# Patient Record
Sex: FEMALE | Race: WHITE | Employment: OTHER | URBAN - METROPOLITAN AREA
[De-identification: names, ages, dates, MRNs, and addresses within clinical notes are randomized per-mention and may not be internally consistent; named-entity substitution may affect disease eponyms.]

---

## 2023-07-14 ENCOUNTER — HOSPITAL ENCOUNTER (EMERGENCY)
Age: 88
Discharge: HOME | End: 2023-07-14
Attending: STUDENT IN AN ORGANIZED HEALTH CARE EDUCATION/TRAINING PROGRAM
Payer: MEDICARE

## 2023-07-14 ENCOUNTER — APPOINTMENT (OUTPATIENT)
Dept: GENERAL RADIOLOGY | Age: 88
End: 2023-07-14
Payer: MEDICARE

## 2023-07-14 VITALS
OXYGEN SATURATION: 99 % | SYSTOLIC BLOOD PRESSURE: 146 MMHG | HEIGHT: 58 IN | RESPIRATION RATE: 16 BRPM | WEIGHT: 121 LBS | BODY MASS INDEX: 25.4 KG/M2 | HEART RATE: 64 BPM | TEMPERATURE: 98.2 F | DIASTOLIC BLOOD PRESSURE: 54 MMHG

## 2023-07-14 DIAGNOSIS — S39.012A STRAIN OF LUMBAR REGION, INITIAL ENCOUNTER: Primary | ICD-10-CM

## 2023-07-14 DIAGNOSIS — M54.32 BILATERAL SCIATICA: ICD-10-CM

## 2023-07-14 DIAGNOSIS — M54.31 BILATERAL SCIATICA: ICD-10-CM

## 2023-07-14 PROCEDURE — 6360000002 HC RX W HCPCS: Performed by: STUDENT IN AN ORGANIZED HEALTH CARE EDUCATION/TRAINING PROGRAM

## 2023-07-14 PROCEDURE — 72100 X-RAY EXAM L-S SPINE 2/3 VWS: CPT

## 2023-07-14 PROCEDURE — 96372 THER/PROPH/DIAG INJ SC/IM: CPT

## 2023-07-14 PROCEDURE — 99284 EMERGENCY DEPT VISIT MOD MDM: CPT

## 2023-07-14 PROCEDURE — 73030 X-RAY EXAM OF SHOULDER: CPT

## 2023-07-14 RX ORDER — SERTRALINE HYDROCHLORIDE 25 MG/1
25 TABLET, FILM COATED ORAL DAILY
COMMUNITY

## 2023-07-14 RX ORDER — LIDOCAINE 4 G/G
1 PATCH TOPICAL DAILY
Qty: 30 PATCH | Refills: 0 | Status: SHIPPED | OUTPATIENT
Start: 2023-07-14 | End: 2023-08-13

## 2023-07-14 RX ORDER — OMEPRAZOLE 20 MG/1
40 CAPSULE, DELAYED RELEASE ORAL DAILY
COMMUNITY

## 2023-07-14 RX ORDER — ACETAMINOPHEN 160 MG
TABLET,DISINTEGRATING ORAL
COMMUNITY

## 2023-07-14 RX ORDER — PREGABALIN 25 MG/1
25 CAPSULE ORAL 2 TIMES DAILY
COMMUNITY

## 2023-07-14 RX ORDER — LEVOTHYROXINE SODIUM 0.07 MG/1
75 TABLET ORAL DAILY
COMMUNITY

## 2023-07-14 RX ORDER — ASPIRIN 81 MG/1
81 TABLET, CHEWABLE ORAL DAILY
COMMUNITY

## 2023-07-14 RX ORDER — LOSARTAN POTASSIUM 100 MG/1
100 TABLET ORAL DAILY
COMMUNITY

## 2023-07-14 RX ORDER — ATORVASTATIN CALCIUM 40 MG/1
40 TABLET, FILM COATED ORAL DAILY
COMMUNITY

## 2023-07-14 RX ORDER — PREDNISONE 10 MG/1
TABLET ORAL
Qty: 20 TABLET | Refills: 0 | Status: SHIPPED | OUTPATIENT
Start: 2023-07-14 | End: 2023-07-24

## 2023-07-14 RX ORDER — ATENOLOL 50 MG/1
50 TABLET ORAL DAILY
COMMUNITY

## 2023-07-14 RX ORDER — DEXAMETHASONE SODIUM PHOSPHATE 4 MG/ML
8 INJECTION, SOLUTION INTRA-ARTICULAR; INTRALESIONAL; INTRAMUSCULAR; INTRAVENOUS; SOFT TISSUE ONCE
Status: COMPLETED | OUTPATIENT
Start: 2023-07-14 | End: 2023-07-14

## 2023-07-14 RX ORDER — METHOCARBAMOL 750 MG/1
750 TABLET, FILM COATED ORAL 4 TIMES DAILY
Qty: 40 TABLET | Refills: 0 | Status: SHIPPED | OUTPATIENT
Start: 2023-07-14 | End: 2023-07-24

## 2023-07-14 RX ADMIN — DEXAMETHASONE SODIUM PHOSPHATE 8 MG: 4 INJECTION, SOLUTION INTRAMUSCULAR; INTRAVENOUS at 12:33

## 2023-07-14 ASSESSMENT — PAIN SCALES - GENERAL: PAINLEVEL_OUTOF10: 4

## 2023-07-14 ASSESSMENT — PAIN - FUNCTIONAL ASSESSMENT: PAIN_FUNCTIONAL_ASSESSMENT: 0-10

## 2023-07-14 ASSESSMENT — PAIN DESCRIPTION - ORIENTATION: ORIENTATION: LOWER;MID

## 2023-07-14 ASSESSMENT — PAIN DESCRIPTION - LOCATION: LOCATION: BACK

## 2023-07-14 NOTE — ED PROVIDER NOTES
Emergency Department Provider Note  Location: [address] [place] [address]  7/14/2023     Patient Identification  Anjelica Hazel is a 80 y.o. female    Chief Complaint  Back Pain (Pt c/o lower back pain. States she thinks it's flared up from riding in different wheelchairs. Has been living with her daughter x past 9 days and daughter thinks this is an exacerbation of OA. Pt ambulated to room with walker.)      Mode of Arrival  private car    HPI  (History provided by patient)  This is a 80 y.o. female with a PMH significant for HTN, HLD  presented today for acute on chronic low back pain. Symptoms have been ongoing for the last 9 days. She is currently from out of town. Patient reports that she think she flared up from riding in different wheelchairs over the past couple of days. She is also sleeping on a new bed. She is still able to ambulate with a walker. Pain is midline and sometimes radiates down her legs. Denies any new numbness or tingling. Denies any injury. Denies any nausea or vomiting. Denies any fever. She is taking Tylenol and using pain patches with no significant B..      ROS  Review of Systems   All other systems reviewed and are negative. I have reviewed the following nursing documentation:  Allergies: Allergies   Allergen Reactions    Ciprofloxacin        Past medical history:  has a past medical history of Cerebral artery occlusion with cerebral infarction [facility]), Depression, Hemorrhagic stroke ([address] [facility]), Hyperlipidemia, Hypertension, and Thyroid disease. Past surgical history:  has a past surgical history that includes Cardiac surgery. Home medications:   Prior to Admission medications    Medication Sig Start Date End Date Taking? Authorizing Provider   pregabalin (LYRICA) 25 MG capsule Take 1 capsule by mouth 2 times daily.  Max Daily Amount: 50 mg   Yes Historical Provider, MD   atenolol (TENORMIN) 50 MG tablet Take 1 tablet by mouth daily   Yes Historical

## 2024-07-21 ENCOUNTER — HOSPITAL ENCOUNTER (INPATIENT)
Age: 89
LOS: 4 days | Discharge: HOME | End: 2024-07-25
Attending: STUDENT IN AN ORGANIZED HEALTH CARE EDUCATION/TRAINING PROGRAM | Admitting: INTERNAL MEDICINE
Payer: MEDICARE

## 2024-07-21 ENCOUNTER — APPOINTMENT (OUTPATIENT)
Dept: CT IMAGING | Age: 89
End: 2024-07-21
Payer: MEDICARE

## 2024-07-21 ENCOUNTER — APPOINTMENT (OUTPATIENT)
Dept: GENERAL RADIOLOGY | Age: 89
End: 2024-07-21
Payer: MEDICARE

## 2024-07-21 DIAGNOSIS — S06.5XAA SUBDURAL HEMATOMA (HCC): Primary | ICD-10-CM

## 2024-07-21 DIAGNOSIS — E87.1 HYPONATREMIA: ICD-10-CM

## 2024-07-21 PROBLEM — I10 PRIMARY HYPERTENSION: Status: ACTIVE | Noted: 2024-07-21

## 2024-07-21 PROBLEM — E03.9 HYPOTHYROIDISM: Status: ACTIVE | Noted: 2024-07-21

## 2024-07-21 LAB
ALBUMIN SERPL-MCNC: 4 G/DL (ref 3.4–5)
ALBUMIN/GLOB SERPL: 1.2 {RATIO} (ref 1.1–2.2)
ALP SERPL-CCNC: 63 U/L (ref 40–129)
ALT SERPL-CCNC: 20 U/L (ref 10–40)
ANION GAP SERPL CALCULATED.3IONS-SCNC: 11 MMOL/L (ref 3–16)
APTT BLD: 22.9 SEC (ref 22.1–36.4)
AST SERPL-CCNC: 28 U/L (ref 15–37)
BASOPHILS # BLD: 0 K/UL (ref 0–0.2)
BASOPHILS NFR BLD: 0.5 %
BILIRUB SERPL-MCNC: 0.5 MG/DL (ref 0–1)
BILIRUB UR QL STRIP.AUTO: NEGATIVE
BUN SERPL-MCNC: 10 MG/DL (ref 7–20)
CALCIUM SERPL-MCNC: 9.1 MG/DL (ref 8.3–10.6)
CHLORIDE SERPL-SCNC: 88 MMOL/L (ref 99–110)
CLARITY UR: CLEAR
CLOSURE TME BLD-IMP: ABNORMAL
CLOSURE TME COLL+ADP BLD: 144 SEC (ref 56–110)
CLOSURE TME COLL+EPINEP BLD: 283 SEC (ref 86–194)
CO2 SERPL-SCNC: 23 MMOL/L (ref 21–32)
COLOR UR: YELLOW
CREAT SERPL-MCNC: <0.5 MG/DL (ref 0.6–1.2)
DEPRECATED RDW RBC AUTO: 14.6 % (ref 12.4–15.4)
EOSINOPHIL # BLD: 0.1 K/UL (ref 0–0.6)
EOSINOPHIL NFR BLD: 1.4 %
GFR SERPLBLD CREATININE-BSD FMLA CKD-EPI: 87 ML/MIN/{1.73_M2}
GLUCOSE SERPL-MCNC: 105 MG/DL (ref 70–99)
GLUCOSE UR STRIP.AUTO-MCNC: NEGATIVE MG/DL
HCT VFR BLD AUTO: 34.9 % (ref 36–48)
HGB BLD-MCNC: 11.7 G/DL (ref 12–16)
HGB UR QL STRIP.AUTO: NEGATIVE
INR PPP: 1.08 (ref 0.85–1.15)
KETONES UR STRIP.AUTO-MCNC: NEGATIVE MG/DL
LEUKOCYTE ESTERASE UR QL STRIP.AUTO: NEGATIVE
LYMPHOCYTES # BLD: 1.2 K/UL (ref 1–5.1)
LYMPHOCYTES NFR BLD: 18.5 %
MCH RBC QN AUTO: 28.7 PG (ref 26–34)
MCHC RBC AUTO-ENTMCNC: 33.7 G/DL (ref 31–36)
MCV RBC AUTO: 85.3 FL (ref 80–100)
MONOCYTES # BLD: 0.7 K/UL (ref 0–1.3)
MONOCYTES NFR BLD: 10.7 %
NEUTROPHILS # BLD: 4.3 K/UL (ref 1.7–7.7)
NEUTROPHILS NFR BLD: 68.9 %
NITRITE UR QL STRIP.AUTO: NEGATIVE
OSMOLALITY SERPL: 268 MOSM/KG (ref 278–305)
OSMOLALITY UR: 230 MOSM/KG (ref 390–1070)
PH UR STRIP.AUTO: 7 [PH] (ref 5–8)
PLATELET # BLD AUTO: 240 K/UL (ref 135–450)
PMV BLD AUTO: 6.9 FL (ref 5–10.5)
POTASSIUM SERPL-SCNC: 4.8 MMOL/L (ref 3.5–5.1)
POTASSIUM UR-SCNC: 21.7 MMOL/L
PROT SERPL-MCNC: 7.3 G/DL (ref 6.4–8.2)
PROT UR STRIP.AUTO-MCNC: NEGATIVE MG/DL
PROTHROMBIN TIME: 14.3 SEC (ref 11.9–14.9)
RBC # BLD AUTO: 4.09 M/UL (ref 4–5.2)
SODIUM SERPL-SCNC: 122 MMOL/L (ref 136–145)
SODIUM SERPL-SCNC: 124 MMOL/L (ref 136–145)
SODIUM SERPL-SCNC: 128 MMOL/L (ref 136–145)
SODIUM UR-SCNC: 44 MMOL/L
SP GR UR STRIP.AUTO: <=1.005 (ref 1–1.03)
TROPONIN, HIGH SENSITIVITY: 17 NG/L (ref 0–14)
TSH SERPL DL<=0.005 MIU/L-ACNC: 14.84 UIU/ML (ref 0.27–4.2)
UA DIPSTICK W REFLEX MICRO PNL UR: NORMAL
URN SPEC COLLECT METH UR: NORMAL
UROBILINOGEN UR STRIP-ACNC: 0.2 E.U./DL
WBC # BLD AUTO: 6.2 K/UL (ref 4–11)

## 2024-07-21 PROCEDURE — 36415 COLL VENOUS BLD VENIPUNCTURE: CPT

## 2024-07-21 PROCEDURE — 87086 URINE CULTURE/COLONY COUNT: CPT

## 2024-07-21 PROCEDURE — 83935 ASSAY OF URINE OSMOLALITY: CPT

## 2024-07-21 PROCEDURE — 70450 CT HEAD/BRAIN W/O DYE: CPT

## 2024-07-21 PROCEDURE — 85610 PROTHROMBIN TIME: CPT

## 2024-07-21 PROCEDURE — 84439 ASSAY OF FREE THYROXINE: CPT

## 2024-07-21 PROCEDURE — 72125 CT NECK SPINE W/O DYE: CPT

## 2024-07-21 PROCEDURE — 84300 ASSAY OF URINE SODIUM: CPT

## 2024-07-21 PROCEDURE — 72170 X-RAY EXAM OF PELVIS: CPT

## 2024-07-21 PROCEDURE — 84133 ASSAY OF URINE POTASSIUM: CPT

## 2024-07-21 PROCEDURE — 6370000000 HC RX 637 (ALT 250 FOR IP): Performed by: STUDENT IN AN ORGANIZED HEALTH CARE EDUCATION/TRAINING PROGRAM

## 2024-07-21 PROCEDURE — 83930 ASSAY OF BLOOD OSMOLALITY: CPT

## 2024-07-21 PROCEDURE — 2060000000 HC ICU INTERMEDIATE R&B

## 2024-07-21 PROCEDURE — 80053 COMPREHEN METABOLIC PANEL: CPT

## 2024-07-21 PROCEDURE — 85576 BLOOD PLATELET AGGREGATION: CPT

## 2024-07-21 PROCEDURE — 84443 ASSAY THYROID STIM HORMONE: CPT

## 2024-07-21 PROCEDURE — 84484 ASSAY OF TROPONIN QUANT: CPT

## 2024-07-21 PROCEDURE — 93005 ELECTROCARDIOGRAM TRACING: CPT | Performed by: STUDENT IN AN ORGANIZED HEALTH CARE EDUCATION/TRAINING PROGRAM

## 2024-07-21 PROCEDURE — 84295 ASSAY OF SERUM SODIUM: CPT

## 2024-07-21 PROCEDURE — 85025 COMPLETE CBC W/AUTO DIFF WBC: CPT

## 2024-07-21 PROCEDURE — 99285 EMERGENCY DEPT VISIT HI MDM: CPT

## 2024-07-21 PROCEDURE — 6370000000 HC RX 637 (ALT 250 FOR IP): Performed by: INTERNAL MEDICINE

## 2024-07-21 PROCEDURE — 99223 1ST HOSP IP/OBS HIGH 75: CPT | Performed by: INTERNAL MEDICINE

## 2024-07-21 PROCEDURE — 85730 THROMBOPLASTIN TIME PARTIAL: CPT

## 2024-07-21 PROCEDURE — 81003 URINALYSIS AUTO W/O SCOPE: CPT

## 2024-07-21 PROCEDURE — 2580000003 HC RX 258: Performed by: INTERNAL MEDICINE

## 2024-07-21 RX ORDER — PREGABALIN 25 MG/1
25 CAPSULE ORAL 2 TIMES DAILY
Status: DISCONTINUED | OUTPATIENT
Start: 2024-07-21 | End: 2024-07-25 | Stop reason: HOSPADM

## 2024-07-21 RX ORDER — MAGNESIUM SULFATE IN WATER 40 MG/ML
2000 INJECTION, SOLUTION INTRAVENOUS PRN
Status: DISCONTINUED | OUTPATIENT
Start: 2024-07-21 | End: 2024-07-25 | Stop reason: HOSPADM

## 2024-07-21 RX ORDER — LUBIPROSTONE 24 UG/1
24 CAPSULE ORAL 2 TIMES DAILY WITH MEALS
COMMUNITY

## 2024-07-21 RX ORDER — ONDANSETRON 4 MG/1
4 TABLET, ORALLY DISINTEGRATING ORAL EVERY 8 HOURS PRN
Status: DISCONTINUED | OUTPATIENT
Start: 2024-07-21 | End: 2024-07-25 | Stop reason: HOSPADM

## 2024-07-21 RX ORDER — ACETAMINOPHEN 325 MG/1
650 TABLET ORAL EVERY 6 HOURS PRN
Status: DISCONTINUED | OUTPATIENT
Start: 2024-07-21 | End: 2024-07-25 | Stop reason: HOSPADM

## 2024-07-21 RX ORDER — SODIUM CHLORIDE 9 MG/ML
INJECTION, SOLUTION INTRAVENOUS CONTINUOUS
Status: ACTIVE | OUTPATIENT
Start: 2024-07-21 | End: 2024-07-21

## 2024-07-21 RX ORDER — LEVOTHYROXINE SODIUM 0.07 MG/1
75 TABLET ORAL DAILY
Status: DISCONTINUED | OUTPATIENT
Start: 2024-07-21 | End: 2024-07-25 | Stop reason: HOSPADM

## 2024-07-21 RX ORDER — SODIUM CHLORIDE 9 MG/ML
INJECTION, SOLUTION INTRAVENOUS PRN
Status: DISCONTINUED | OUTPATIENT
Start: 2024-07-21 | End: 2024-07-25 | Stop reason: HOSPADM

## 2024-07-21 RX ORDER — ATORVASTATIN CALCIUM 40 MG/1
40 TABLET, FILM COATED ORAL DAILY
Status: DISCONTINUED | OUTPATIENT
Start: 2024-07-21 | End: 2024-07-25 | Stop reason: HOSPADM

## 2024-07-21 RX ORDER — ACETAMINOPHEN 500 MG
1000 TABLET ORAL
Status: COMPLETED | OUTPATIENT
Start: 2024-07-21 | End: 2024-07-21

## 2024-07-21 RX ORDER — ACETAMINOPHEN 650 MG/1
650 SUPPOSITORY RECTAL EVERY 6 HOURS PRN
Status: DISCONTINUED | OUTPATIENT
Start: 2024-07-21 | End: 2024-07-25 | Stop reason: HOSPADM

## 2024-07-21 RX ORDER — SODIUM CHLORIDE 0.9 % (FLUSH) 0.9 %
5-40 SYRINGE (ML) INJECTION EVERY 12 HOURS SCHEDULED
Status: DISCONTINUED | OUTPATIENT
Start: 2024-07-21 | End: 2024-07-25 | Stop reason: HOSPADM

## 2024-07-21 RX ORDER — POTASSIUM CHLORIDE 20 MEQ/1
40 TABLET, EXTENDED RELEASE ORAL PRN
Status: DISCONTINUED | OUTPATIENT
Start: 2024-07-21 | End: 2024-07-25 | Stop reason: HOSPADM

## 2024-07-21 RX ORDER — ONDANSETRON 2 MG/ML
4 INJECTION INTRAMUSCULAR; INTRAVENOUS EVERY 6 HOURS PRN
Status: DISCONTINUED | OUTPATIENT
Start: 2024-07-21 | End: 2024-07-25 | Stop reason: HOSPADM

## 2024-07-21 RX ORDER — POTASSIUM CHLORIDE 7.45 MG/ML
10 INJECTION INTRAVENOUS PRN
Status: DISCONTINUED | OUTPATIENT
Start: 2024-07-21 | End: 2024-07-25 | Stop reason: HOSPADM

## 2024-07-21 RX ORDER — POLYETHYLENE GLYCOL 3350 17 G/17G
17 POWDER, FOR SOLUTION ORAL DAILY PRN
Status: DISCONTINUED | OUTPATIENT
Start: 2024-07-21 | End: 2024-07-25 | Stop reason: HOSPADM

## 2024-07-21 RX ORDER — LOSARTAN POTASSIUM 25 MG/1
100 TABLET ORAL DAILY
Status: DISCONTINUED | OUTPATIENT
Start: 2024-07-21 | End: 2024-07-25 | Stop reason: HOSPADM

## 2024-07-21 RX ORDER — HYDRALAZINE HYDROCHLORIDE 20 MG/ML
10 INJECTION INTRAMUSCULAR; INTRAVENOUS EVERY 6 HOURS PRN
Status: DISCONTINUED | OUTPATIENT
Start: 2024-07-21 | End: 2024-07-25 | Stop reason: HOSPADM

## 2024-07-21 RX ORDER — ATENOLOL 50 MG/1
50 TABLET ORAL 2 TIMES DAILY
Status: DISCONTINUED | OUTPATIENT
Start: 2024-07-21 | End: 2024-07-25 | Stop reason: HOSPADM

## 2024-07-21 RX ORDER — SODIUM CHLORIDE 0.9 % (FLUSH) 0.9 %
5-40 SYRINGE (ML) INJECTION PRN
Status: DISCONTINUED | OUTPATIENT
Start: 2024-07-21 | End: 2024-07-25 | Stop reason: HOSPADM

## 2024-07-21 RX ADMIN — LOSARTAN POTASSIUM 100 MG: 25 TABLET, FILM COATED ORAL at 11:03

## 2024-07-21 RX ADMIN — ATENOLOL 50 MG: 50 TABLET ORAL at 20:10

## 2024-07-21 RX ADMIN — SODIUM CHLORIDE, PRESERVATIVE FREE 10 ML: 5 INJECTION INTRAVENOUS at 11:04

## 2024-07-21 RX ADMIN — ACETAMINOPHEN 1000 MG: 500 TABLET ORAL at 08:00

## 2024-07-21 RX ADMIN — PREGABALIN 25 MG: 25 CAPSULE ORAL at 20:10

## 2024-07-21 RX ADMIN — PREGABALIN 25 MG: 25 CAPSULE ORAL at 11:03

## 2024-07-21 RX ADMIN — ATORVASTATIN CALCIUM 40 MG: 40 TABLET, FILM COATED ORAL at 11:03

## 2024-07-21 ASSESSMENT — PAIN - FUNCTIONAL ASSESSMENT
PAIN_FUNCTIONAL_ASSESSMENT: PREVENTS OR INTERFERES SOME ACTIVE ACTIVITIES AND ADLS
PAIN_FUNCTIONAL_ASSESSMENT: 0-10
PAIN_FUNCTIONAL_ASSESSMENT: PREVENTS OR INTERFERES SOME ACTIVE ACTIVITIES AND ADLS
PAIN_FUNCTIONAL_ASSESSMENT: 0-10
PAIN_FUNCTIONAL_ASSESSMENT: ACTIVITIES ARE NOT PREVENTED

## 2024-07-21 ASSESSMENT — PAIN DESCRIPTION - PAIN TYPE: TYPE: ACUTE PAIN

## 2024-07-21 ASSESSMENT — PAIN DESCRIPTION - ORIENTATION
ORIENTATION: POSTERIOR

## 2024-07-21 ASSESSMENT — PAIN DESCRIPTION - ONSET: ONSET: SUDDEN

## 2024-07-21 ASSESSMENT — PAIN SCALES - GENERAL
PAINLEVEL_OUTOF10: 8

## 2024-07-21 ASSESSMENT — PAIN DESCRIPTION - LOCATION
LOCATION: HEAD

## 2024-07-21 ASSESSMENT — PAIN DESCRIPTION - FREQUENCY: FREQUENCY: CONTINUOUS

## 2024-07-21 ASSESSMENT — PAIN DESCRIPTION - DESCRIPTORS: DESCRIPTORS: OTHER (COMMENT)

## 2024-07-21 NOTE — ED NOTES
ED TO INPATIENT SBAR HANDOFF    Patient Name: Zully Escudero   :  1931  89 y.o.   MRN:  [id number]  Preferred Name    ED Room #:  A07/A07-07  Family/Caregiver Present yes   Restraints no   Sitter no   Sepsis Risk Score      Situation  Code Status: No Order No additional code details.    Allergies: Ciprofloxacin  Weight: Patient Vitals for the past 96 hrs (Last 3 readings):   Weight   24 0726 55.1 kg (121 lb 8 oz)     Arrived from: home- pt lives in [place], in her own home with daytime care  Pt is currently visiting/staying with her daughter  Chief Complaint:   Chief Complaint   Patient presents with    Fall    Head Injury     Hospital Problem/Diagnosis:  Principal Problem:    Subdural hematoma (HCC)  Resolved Problems:    * No resolved hospital problems. *    Imaging:   XR PELVIS (1-2 VIEWS)   Final Result   1. Degenerative hypertrophic osteoarthritis of the hips, left greater than right   2. SI joint arthropathy   3. Lower lumbar spondylosis and scoliosis   4. No significant interval changes      Electronically signed by Will Colin MD      CT C-Spine W/O Contrast   Final Result      Degenerative changes, spondylosis.   2. No evidence of acute fracture or dislocation   Levoscoliosis      Electronically signed by Will Colin MD      CT Head W/O Contrast   Final Result   1. Right tentorial subdural hematoma measuring 5.3 mm in thickness. There is a central area of lucency on image 43. Whether this represents a small \"swirl sign\" is uncertain. Short-term interval follow-up imaging is recommended for surveillance.      2. Periventricular microangiopathic ischemic changes, chronic small vessel disease   3. Age-related atrophic changes         Critical results reported to Physician: Jody Tobar at 8:12 AM on 2024.       Electronically signed by Will Colin MD        Abnormal labs:   Abnormal Labs Reviewed   CBC WITH AUTO DIFFERENTIAL - Abnormal; Notable for the

## 2024-07-21 NOTE — PROGRESS NOTES
Urine has been sent, urine did make it in the hat this time while the PCA assisted the pt to the bathroom then back to bed. Emily Perera RN

## 2024-07-21 NOTE — PLAN OF CARE
Problem: Discharge Planning  Goal: Discharge to home or other facility with appropriate resources  Outcome: Progressing     Problem: Pain  Goal: Verbalizes/displays adequate comfort level or baseline comfort level  Outcome: Progressing     Problem: Safety - Adult  Goal: Free from fall injury  Outcome: Progressing     Problem: Discharge Planning  Goal: Discharge to home or other facility with appropriate resources  Outcome: Progressing     Problem: Pain  Goal: Verbalizes/displays adequate comfort level or baseline comfort level  Outcome: Progressing     Problem: Safety - Adult  Goal: Free from fall injury  Outcome: Progressing

## 2024-07-21 NOTE — ED PROVIDER NOTES
THE [facility]  EMERGENCY DEPARTMENT ENCOUNTER          ATTENDING PHYSICIAN NOTE       Date of evaluation: 7/21/2024    Chief Complaint     Fall and Head Injury      History of Present Illness     Zully Escudero is a 89 y.o. female who presents to the emergency department after a fall from standing.  Patient reports that she was trying to get out of bed this morning when she had a fall.  History obtained from the patient, EMS, and family members.  Patient reported that she was getting up to go to the bathroom and had a mechanical fall falling and striking the back of her head on her bed.  No loss of consciousness, seizure activity, nausea or vomiting afterwards.  Patient reported she felt a little bit woozy which prompted family members to call 911.  Patient reports that she is on 81 mg of aspirin due to a history of a stroke.  Reports that she has pain in her buttock as well.  Was on the ground for no more than a few moments his family members heard the fall and came to her aid immediately.  Denies chest pain, abdominal pain, pain in her extremities or difficulty with range of motion of the extremities    ASSESSMENT / PLAN  (MEDICAL DECISION MAKING)     INITIAL VITALS: BP: (!) 180/62, Temp: 97.6 °F (36.4 °C), Pulse: 55, Respirations: 16, SpO2: 97 %      Zully Escudero is a 89 y.o. female who presents emergency department for a mechanical fall resulting in a subdural hematoma.  Patient is 89, on 81 mg of aspirin daily, has a history of stroke, hypothyroidism, and presented to the emergency department after having a mechanical fall while trying to get of bed to go to the bathroom.  Patient on my examination is noted to have intact primary survey and on secondary survey has a very small hematoma on her posterior occipital scalp without overlying skin lacerations or abrasions.  Patient is GCS 15 interactive with exam and has no neurologic deficits.  Patient denies any preceding symptoms to  changes   Psychiatric/Behavioral:  Negative for dysphoric mood and suicidal ideas.    All other systems reviewed and are negative.      Past Medical, Surgical, Family, and Social History     She has a past medical history of Cerebral artery occlusion with cerebral infarction (HCC), Depression, Hemorrhagic stroke (HCC), Hyperlipidemia, Hypertension, and Thyroid disease.  She has a past surgical history that includes Cardiac surgery.  Her family history is not on file.  She reports that she has never smoked. She has never used smokeless tobacco. She reports that she does not drink alcohol.    Medications     Previous Medications    ASPIRIN 81 MG CHEWABLE TABLET    Take 1 tablet by mouth daily    ATENOLOL (TENORMIN) 50 MG TABLET    Take 1 tablet by mouth in the morning and at bedtime    ATORVASTATIN (LIPITOR) 40 MG TABLET    Take 1 tablet by mouth daily    CHOLECALCIFEROL (VITAMIN D3) 50 MCG (2000 UT) CAPS    Take by mouth    LEVOTHYROXINE (SYNTHROID) 75 MCG TABLET    Take 1 tablet by mouth Daily    LOSARTAN (COZAAR) 100 MG TABLET    Take 1 tablet by mouth daily    LUBIPROSTONE (AMITIZA) 24 MCG CAPSULE    Take 1 capsule by mouth 2 times daily (with meals)    PREGABALIN (LYRICA) 25 MG CAPSULE    Take 1 capsule by mouth 2 times daily.    SERTRALINE (ZOLOFT) 25 MG TABLET    Take 1 tablet by mouth daily       Allergies     She is allergic to ciprofloxacin.    Physical Exam     INITIAL VITALS: BP: (!) 180/62, Temp: 97.6 °F (36.4 °C), Pulse: 55, Respirations: 16, SpO2: 97 %   Physical Exam  General:  Well appearing. No acute distress.  Non-toxic appearing elderly female who is awake and alert sitting on the stretcher    HEENT: Head normocephalic with a area of swelling over the occiput.  There are no overlying lacerations or ecchymosis., no Vieira's sign or Rac[place] eyes, pupils equal round and reactive to light, EOMI, sclera clear, no facial tenderness to palpation or step offs, no midface instability, no hemotympanum

## 2024-07-21 NOTE — ED TRIAGE NOTES
Home Medication List is complete.  Source of medications in list is prepackaged pills/patient/family.    Patient states that no medications were taken today.  All medications were taken yesterday      P

## 2024-07-21 NOTE — CONSULTS
Consult received  Na 122  SDH   Hypothyroidism     Urine  lytes ordered  Water restriction   Pt seen    full note later    Rossana Martinez MD

## 2024-07-21 NOTE — PROGRESS NOTES
The pt's heart rate is running in the 50's, perfect served Dr. Guo to see if she wants me to hold the pt's Atenolol. I will continue to follow. Emily Perera RN

## 2024-07-21 NOTE — PROGRESS NOTES
4 Eyes Skin Assessment     NAME:  Zully Escudero  YOB: 1931  MEDICAL RECORD NUMBER:  [id number]    The patient is being assessed for  Admission    I agree that at least one RN has performed a thorough Head to Toe Skin Assessment on the patient. ALL assessment sites listed below have been assessed.      Areas assessed by both nurses:    Head, Face, Ears, Shoulders, Back, Chest, Arms, Elbows, Hands, Sacrum. Buttock, Coccyx, Ischium, and Legs. Feet and Heels        Does the Patient have a Wound? No noted wound(s)    - scattered abrasions  - scattered ecchymosis       Garcia Prevention initiated by RN: Yes  Wound Care Orders initiated by RN: No    Pressure Injury (Stage 3,4, Unstageable, DTI, NWPT, and Complex wounds) if present, place Wound referral order by RN under : No    New Ostomies, if present place, Ostomy referral order under : No     Nurse 1 eSignature: Electronically signed by Chavo Eduardo RN on 7/21/24 at 1:11 PM EDT    **SHARE this note so that the co-signing nurse can place an eSignature**    Nurse 2 eSignature: Electronically signed by Emily Perera RN on 7/21/24 at 1:16 PM EDT

## 2024-07-21 NOTE — PROGRESS NOTES
Pt urinated but missed the hat, I will need to place another hat in the back to catch the urine next time. Emily Perera RN

## 2024-07-21 NOTE — H&P
V2.0  History and Physical      Name:  Zully Escudero /Age/Sex: 1931  (89 y.o. female)   MRN & CSN:  [id number] & [id number] Encounter Date/Time: 2024 1:47 PM EDT   Location:  [facility] PCP: No primary care provider on file.       Hospital Day: 1    Assessment and Plan:   Zully Escudero is a 89 y.o. female with a pmh of irritable bowel syndrome, coronary artery disease, hypertension who presents with Subdural hematoma (HCC)    Hospital Problems             Last Modified POA    * (Principal) Subdural hematoma (HCC) 2024 Yes       Plan:    Subdural hematoma  -CT head shows right tentorial subdural hematoma 5.3 mm  -Neurosurgery consulted  -Full anticoagulation  -Further recommendation per neurosurgery    Mechanical fall  -PT OT consulted    Hyponatremia  -Osmolality urine studies ordered  -TSH ordered  -Nephrology consulted  -Baseline sodium 131    Hypothyroidism  -Continue home meds    Diarrhea history of IBS  -Patient has chronic diarrhea with some worsening 1 week ago.  -If diarrhea persists will get C. difficile test  -Patient is already established with GI  -History of antibiotic course UTI    Coronary artery disease  -Hold aspirin  -Statin    Hypertension  -Monitor blood pressure continue home    Goals of care discussed with daughter.  Patient is a full code        Disposition:   Current Living situation: home  Expected Disposition: home  Estimated D/C: 2-3 days    Diet ADULT DIET; Regular   DVT Prophylaxis [] Lovenox, []  Heparin, [x] SCDs, [] Ambulation,  [] Eliquis, [] Xarelto, [] Coumadin   Code Status Full Code   Surrogate Decision Maker/ POA Daughter      Personally reviewed Lab Studies and Imaging     Discussed management of the case with ED who recommended admission     EKG interpreted personally and results sinus bradycardia    Imaging that was interpreted personally includes CT head  and results SDH            History from:     patient, family member

## 2024-07-22 ENCOUNTER — APPOINTMENT (OUTPATIENT)
Dept: CT IMAGING | Age: 89
End: 2024-07-22
Payer: MEDICARE

## 2024-07-22 LAB
ANION GAP SERPL CALCULATED.3IONS-SCNC: 13 MMOL/L (ref 3–16)
BACTERIA UR CULT: NORMAL
BUN SERPL-MCNC: 10 MG/DL (ref 7–20)
CALCIUM SERPL-MCNC: 8.6 MG/DL (ref 8.3–10.6)
CHLORIDE SERPL-SCNC: 88 MMOL/L (ref 99–110)
CO2 SERPL-SCNC: 22 MMOL/L (ref 21–32)
CREAT SERPL-MCNC: <0.5 MG/DL (ref 0.6–1.2)
EKG ATRIAL RATE: 55 BPM
EKG ATRIAL RATE: 57 BPM
EKG DIAGNOSIS: NORMAL
EKG DIAGNOSIS: NORMAL
EKG P AXIS: 46 DEGREES
EKG P AXIS: 61 DEGREES
EKG P-R INTERVAL: 164 MS
EKG P-R INTERVAL: 184 MS
EKG Q-T INTERVAL: 414 MS
EKG Q-T INTERVAL: 420 MS
EKG QRS DURATION: 68 MS
EKG QRS DURATION: 74 MS
EKG QTC CALCULATION (BAZETT): 396 MS
EKG QTC CALCULATION (BAZETT): 408 MS
EKG R AXIS: -4 DEGREES
EKG R AXIS: 1 DEGREES
EKG T AXIS: 22 DEGREES
EKG T AXIS: 43 DEGREES
EKG VENTRICULAR RATE: 55 BPM
EKG VENTRICULAR RATE: 57 BPM
GFR SERPLBLD CREATININE-BSD FMLA CKD-EPI: 87 ML/MIN/{1.73_M2}
GLUCOSE SERPL-MCNC: 114 MG/DL (ref 70–99)
OSMOLALITY UR: 297 MOSM/KG (ref 390–1070)
POTASSIUM SERPL-SCNC: 4.2 MMOL/L (ref 3.5–5.1)
POTASSIUM UR-SCNC: 34.3 MMOL/L
SODIUM SERPL-SCNC: 123 MMOL/L (ref 136–145)
SODIUM SERPL-SCNC: 124 MMOL/L (ref 136–145)
SODIUM SERPL-SCNC: 125 MMOL/L (ref 136–145)
SODIUM SERPL-SCNC: 127 MMOL/L (ref 136–145)
SODIUM UR-SCNC: 44 MMOL/L
T4 FREE SERPL-MCNC: 1.1 NG/DL (ref 0.9–1.8)

## 2024-07-22 PROCEDURE — 80048 BASIC METABOLIC PNL TOTAL CA: CPT

## 2024-07-22 PROCEDURE — 99222 1ST HOSP IP/OBS MODERATE 55: CPT | Performed by: NURSE PRACTITIONER

## 2024-07-22 PROCEDURE — 97535 SELF CARE MNGMENT TRAINING: CPT

## 2024-07-22 PROCEDURE — 84300 ASSAY OF URINE SODIUM: CPT

## 2024-07-22 PROCEDURE — 70450 CT HEAD/BRAIN W/O DYE: CPT

## 2024-07-22 PROCEDURE — 97166 OT EVAL MOD COMPLEX 45 MIN: CPT

## 2024-07-22 PROCEDURE — 97530 THERAPEUTIC ACTIVITIES: CPT

## 2024-07-22 PROCEDURE — 83935 ASSAY OF URINE OSMOLALITY: CPT

## 2024-07-22 PROCEDURE — 36415 COLL VENOUS BLD VENIPUNCTURE: CPT

## 2024-07-22 PROCEDURE — 99233 SBSQ HOSP IP/OBS HIGH 50: CPT | Performed by: INTERNAL MEDICINE

## 2024-07-22 PROCEDURE — 97162 PT EVAL MOD COMPLEX 30 MIN: CPT

## 2024-07-22 PROCEDURE — 97116 GAIT TRAINING THERAPY: CPT

## 2024-07-22 PROCEDURE — 93010 ELECTROCARDIOGRAM REPORT: CPT | Performed by: INTERNAL MEDICINE

## 2024-07-22 PROCEDURE — 6370000000 HC RX 637 (ALT 250 FOR IP): Performed by: INTERNAL MEDICINE

## 2024-07-22 PROCEDURE — 2580000003 HC RX 258: Performed by: INTERNAL MEDICINE

## 2024-07-22 PROCEDURE — 84133 ASSAY OF URINE POTASSIUM: CPT

## 2024-07-22 PROCEDURE — 2060000000 HC ICU INTERMEDIATE R&B

## 2024-07-22 PROCEDURE — 84295 ASSAY OF SERUM SODIUM: CPT

## 2024-07-22 RX ADMIN — Medication 15 G: at 11:44

## 2024-07-22 RX ADMIN — SODIUM CHLORIDE, PRESERVATIVE FREE 10 ML: 5 INJECTION INTRAVENOUS at 08:49

## 2024-07-22 RX ADMIN — SODIUM CHLORIDE, PRESERVATIVE FREE 10 ML: 5 INJECTION INTRAVENOUS at 21:35

## 2024-07-22 RX ADMIN — LEVOTHYROXINE SODIUM 75 MCG: 0.07 TABLET ORAL at 08:45

## 2024-07-22 RX ADMIN — PREGABALIN 25 MG: 25 CAPSULE ORAL at 08:49

## 2024-07-22 RX ADMIN — PREGABALIN 25 MG: 25 CAPSULE ORAL at 21:35

## 2024-07-22 RX ADMIN — LOSARTAN POTASSIUM 100 MG: 25 TABLET, FILM COATED ORAL at 08:49

## 2024-07-22 RX ADMIN — ATORVASTATIN CALCIUM 40 MG: 40 TABLET, FILM COATED ORAL at 08:49

## 2024-07-22 RX ADMIN — ACETAMINOPHEN 650 MG: 325 TABLET ORAL at 04:29

## 2024-07-22 NOTE — PROGRESS NOTES
Occupational Therapy  Facility/Department: [facility] ORTHO/NEURO  Occupational Therapy Initial Assessment    Name: Zully Escudero  : 1931  MRN: [id number]  Date of Service: 2024    Discharge Recommendations:  Home with assist PRN          Patient Diagnosis(es): The encounter diagnosis was Subdural hematoma (HCC).  Past Medical History:  has a past medical history of Cerebral artery occlusion with cerebral infarction (HCC), Depression, Hemorrhagic stroke (HCC), Hyperlipidemia, Hypertension, and Thyroid disease.  Past Surgical History:  has a past surgical history that includes Cardiac surgery and brain surgery.           Assessment   Assessment: Pt is a 89 y.o. presenting near baseline. Pt admitted following a fall from EOB at [facility] resulting in subdural hematoma. Pt from [place], visiting [facility] in [facility] and plans to DC to [facility] for the next month. Pt does not demo decreased strength or balance this date. Pt currently requiring SBA-SPV for all functional mobility and ADLs with RW. Pt with no acute OT needs at this time. Pt to return home with [facility] with previous services. Reorder if new concerns arise.  Prognosis: Good  Decision Making: Medium Complexity  No Skilled OT: Safe to return home;No OT goals identified  REQUIRES OT FOLLOW-UP: No  Activity Tolerance  Activity Tolerance: Patient Tolerated treatment well                Restrictions  Position Activity Restriction  Other position/activity restrictions: Up with assist    Subjective   General  Chart Reviewed: Yes  Additional Pertinent Hx: 89 y.o. female with a pmh of irritable bowel syndrome, coronary artery disease, hypertension who presents with Subdural hematoma.  Family / Caregiver Present: Yes (daughter)  Referring Practitioner: mushtaq  Diagnosis: subdural hematoma  Subjective  Subjective: Pt in chair on arrival with daughter present. Pt agreeable to PT/OT evaluation.  Pt denies pain.  Social/Functional  History  Social/Functional History  Lives With: Daughter  Type of Home: House  Home Layout: Performs ADL's on one level  Home Access: Stairs to enter without rails  Entrance Stairs - Number of Steps: 1 + 1 + 1+ 1, landing between  Bathroom Shower/Tub: Tub/Shower unit  Bathroom Toilet: Standard  Bathroom Equipment: Shower chair, Hand-held shower, Grab bars in shower (non skid mat)  Bathroom Accessibility: Walker accessible  Home Equipment: Cane, Rollator  Has the patient had two or more falls in the past year or any fall with injury in the past year?: Yes (admitting fall, \"slide forward\" out of bed)  Receives Help From: Family, Personal care attendant  ADL Assistance: Needs assistance (SPV for bathing, assist for tub transfer, assist for dressing)  Toileting: Independent  Homemaking Assistance:  (pt occasionally cooks, light meal prep/cleaning, staff helps with light cleaning)  Ambulation Assistance: Independent (with rollator)  Transfer Assistance: Independent  Active : No  Patient's  Info: caregivers drive  Additional Comments: Pt planning to DC home to [facility] for next ~1 month. Information taken reflecting [facility]. When at home Home care ~9AM-3PM, 7PM-AM ~3x week.       Objective   Temp: 97.6 °F (36.4 °C)  Pulse: 52  Heart Rate Source: Monitor  Respirations: 16  SpO2: 96 %  O2 Device: None (Room air)  BP: (!) 167/60  MAP (Calculated): 96  BP Location: Left upper arm  BP Method: Automatic  Patient Position: Up in chair             Safety Devices  Type of Devices: Left in chair;Chair alarm in place;Call light within reach;Nurse notified  Balance  Sitting: Intact (toilet and chair with SPV)  Standing: With support (SBA-SPV with RW)  Gait  Gait Training: Yes (Pt ambulated within room and hallway with SBA and RW. No LOB noted. Slow steady pace.)  Overall Level of Assistance: Stand-by assistance  Toilet Transfers  Toilet - Technique: Ambulating  Equipment Used: Grab bars  Toilet Transfer:

## 2024-07-22 NOTE — CARE COORDINATION
Case Management Assessment  Initial Evaluation    Date/Time of Evaluation: 7/22/2024 1:16 PM  Assessment Completed by: Aida Devine RN    If patient is discharged prior to next notation, then this note serves as note for discharge by case management.    Patient Name: Zully Escudero                   YOB: 1931  Diagnosis: Subdural hematoma (HCC) [S06.5XAA]                   Date / Time: 7/21/2024  7:16 AM    Patient Admission Status: Inpatient   Readmission Risk (Low < 19, Mod (19-27), High > 27): Readmission Risk Score: 11.9    Current PCP: No primary care provider on file.  PCP verified by CM? No    Chart Reviewed: Yes      History Provided by: Patient  Patient Orientation: Alert and Oriented    Patient Cognition: Alert    Hospitalization in the last 30 days (Readmission):  No    If yes, Readmission Assessment in CM Navigator will be completed.    Advance Directives:      Code Status: Full Code   Patient's Primary Decision Maker is: Legal Next of Kin      Discharge Planning:    Patient lives with: Children Type of Home: House  Primary Care Giver: Self  Patient Support Systems include: Children   Current Financial resources: Medicare  Current community resources: Other (Comment)  Current services prior to admission:              Current DME:              Type of Home Care services:       ADLS  Prior functional level: Independent in ADLs/IADLs  Current functional level: Independent in ADLs/IADLs    PT AM-PAC: 22 /24  OT AM-PAC: 22 /24    Family can provide assistance at DC: Yes  Would you like Case Management to discuss the discharge plan with any other family members/significant others, and if so, who? Yes (daughter Kanika)  Plans to Return to Present Housing: Yes  Other Identified Issues/Barriers to RETURNING to current housing: none  Potential Assistance needed at discharge:              Potential DME:    Patient expects to discharge to: House  Plan for transportation at discharge:

## 2024-07-22 NOTE — CONSULTS
NEUROSURGERY CONSULT NOTE    SCOTT VALLES  [id number]   4/9/1931 7/22/2024    Requesting physician: Suyapa Guo MD    Reason for consultation: sdh    History of present illness: .Scott Valles is a 89 y.o. female who presents to the emergency department after a fall from standing.  Patient reports that she was trying to get out of bed yesterday morning when she had a fall.  History obtained from the patient and family members.  Patient reported that she was getting up to go to the bathroom and had a mechanical fall falling and striking the back of her head on her bed.  No loss of consciousness, seizure activity, nausea or vomiting afterwards.  Patient reported she felt a little bit woozy which prompted family members to call 911.  Patient reports that she is on 81 mg of aspirin due to a history of a stroke and bypass surgery.       ROS:   GENERAL:  Denies fever or recent illness. Denies weight changes   EYES:  Denies vision change or diplopia  EARS:  Denies hearing loss  CARDIAC:  Denies chest pain  RESPIRATORY:  Denies shortness of breath  SKIN:  Denies rash or lesions   HEM:  Denies excessive bruising  PSYCH:  Denies anxiety or depression  NEURO:  Denies headache, numbness or tingling or lateralizing weakness   :  Denies urinary difficulty  GI: Denies nausea, vomiting, diarrhea or constipation  MUSCULOSKELETAL:  No arthralgias    Allergies   Allergen Reactions    Ciprofloxacin        Past Medical History:   Diagnosis Date    Cerebral artery occlusion with cerebral infarction (HCC)     Depression     Hemorrhagic stroke (HCC)     Hyperlipidemia     Hypertension     Thyroid disease         Past Surgical History:   Procedure Laterality Date    BRAIN SURGERY      2014 had drain placed after brain bleed was in ICU.    CARDIAC SURGERY      bypass in 2010 in [facility].       Social History     Occupational History    Not on file   Tobacco Use    Smoking status: Never

## 2024-07-22 NOTE — PROGRESS NOTES
Physical Therapy  Facility/Department: [facility] ORTHO/NEURO  Physical Therapy Initial Assessment and DISCHARGE    Name: Zully Escudero  : 1931  MRN: [id number]  Date of Service: 2024    Discharge Recommendations:  Home with assist PRN   PT Equipment Recommendations  Equipment Needed: No        Assessment   Assessment: Pt from home with daughter, admitted for SDH after slipping off bed.  No neuro deficits found on PT exam.  Pt doing well from PT standpoint, demonstrating transfers and gait at/near functional baseline.  Gait is slow, but steady with use of rolling walker.  Pt lives in [place], but will be here for the next month visiting daughter.  Recommend home with increased assist from daughter as needed.  No further acute PT needs.  Recommend continued activity/ambulation with RN staff until discharged.  Will sign off.  Decision Making: Medium Complexity  Requires PT Follow-Up: No     Plan   General Plan: Discharge with evaluation only    Safety Devices  Type of Devices: Left in chair, Chair alarm in place, Call light within reach, Nurse notified (daughter in room)     Restrictions  Other position/activity restrictions: Up with assist     Subjective   Pain: Pt denies pain.    Chart Reviewed: Yes  Additional Pertinent Hx: Pt to ED  s/p fall.  Imaging (+) for right tentorial subdural hematoma 5.3 mm.  Neurosurgery consult.  PMH:  HTN, CVA, depression, hemorrhagic stroke    Diagnosis: SDH    Subjective  Subjective: Pt found sitting up in chair with daughter in room.  Pt pleasant and agreeable for PT.    Social/Functional History  Lives With: Daughter  Type of Home: House  Home Layout: Performs ADL's on one level  Home Access: Stairs to enter without rails  Entrance Stairs - Number of Steps: 1 + 1 + 1+ 1, landing between  Bathroom Shower/Tub: Tub/Shower unit  Bathroom Toilet: Standard  Bathroom Equipment: Shower chair, Hand-held shower, Grab bars in shower (non skid mat)  Bathroom Accessibility:  needed walking in hospital room?: A Little  How much help is needed climbing 3-5 steps with a railing?: A Little  AM-PAC Inpatient Mobility Raw Score : 22  AM-PAC Inpatient T-Scale Score : 53.28  Mobility Inpatient CMS 0-100% Score: 20.91  Mobility Inpatient CMS G-Code Modifier : CJ      Education  Patient Education  Education Given To: Patient  Education Provided: Role of Therapy  Education Method: Verbal  Education Outcome: Verbalized understanding      Therapy Time   Individual Concurrent Group Co-treatment   Time In 0939         Time Out 1033         Minutes 54         Timed Code Treatment Minutes:  40  Total Treatment Minutes:  54      Carmelita Gardner, PT

## 2024-07-22 NOTE — PROGRESS NOTES
Patient is alert and oriented x4. VSS on room air. Medications given per MAR, no side effects noted. Patient ambulating x1 with gait belt and walker. No complaints of pain, continuing to monitor and manage per MAR. Voiding well via BRP, no bm this shift. Patient worked with PT/OT and ambulated in the duncan and around the unit, tolerated well.      Patient is currently resting in bed with bed alarm on for safety. Call light within reach and all fall precautions in place. Plan of care continues.    Electronically signed by Catarina Durbin RN on 7/22/2024 at 6:29 PM

## 2024-07-22 NOTE — PROGRESS NOTES
Nephrology Consult Note                                                                                                                                                                                                                                                                                                                                                               Office : [phone]     Fax :[phone]    Patient's Name: Zully Escudero  10:33 AM  7/22/2024    Reason for Consult:  hyponatremia   Requesting Physician:  No primary care provider on file.  Chief Complaint:    Chief Complaint   Patient presents with    Fall    Head Injury       Assessment/Plan     # Hyponatremia   Moderate   Hypotonic   Normovolemic   Low U FE  U osm low/ normal   TSH 14  Bimodalcorrection   A/P  Likley tea & toast and SIADH   Hypothyorid contribute?   SIADH vs hypothyroid can still, be affecting.   Na 125 today from 122 yesterday   Plan:  Keep monitor Na q6  Goal tomorrow 8: 132-133 .   Urea    fluid restriction fo  U lytes     # SDH  Mild   No intervention expected  Per NS/ primary team     # HTN not controlled  Given SDH- avoid fluctuations   I will discuss w/ NS    # Hypothyroidism   Not controlled  Synthroid per primary team     History of Present Ilness:    Zully Escudero is a 89 y.o. female with  pmh of irritable bowel syndrome, coronary artery disease, hypertension who presents with Subdural hematoma (HCC)   Pt had a mechanical fall and was diagnosed with SDH 5 mm and Na 122. Was started on LR (stopped) after feeling dizzy and not well   Currently no dizziness, mild headache   Pt denies CP/SOB/palpitations/abdominal pain/N/V.   Pt denies fever/ chills  Pt denies dysuria or hematuria     Na 122 (08 AM) --> water restriction---> 128 (08 PM).   U osm 230   U FE 66   Usm low     Interval hx   Overnight Na 128--> 123 (no  treatment)   Past Medical History:   Diagnosis Date    Cerebral artery occlusion  [facility]      CT HEAD WO CONTRAST   Final Result   Stable extent right tentorial subdural hematoma area of slight increase in maximum thickness now measuring 5.8 mm   Age-related cortical atrophy. White matter microangiopathic changes..      Electronically signed by Loyd Burrows      XR PELVIS (1-2 VIEWS)   Final Result   1. Degenerative hypertrophic osteoarthritis of the hips, left greater than right   2. SI joint arthropathy   3. Lower lumbar spondylosis and scoliosis   4. No significant interval changes      Electronically signed by Will Colin MD      CT C-Spine W/O Contrast   Final Result      Degenerative changes, spondylosis.   2. No evidence of acute fracture or dislocation   Levoscoliosis      Electronically signed by Will Colin MD      CT Head W/O Contrast   Final Result   1. Right tentorial subdural hematoma measuring 5.3 mm in thickness. There is a central area of lucency on image 43. Whether this represents a small \"swirl sign\" is uncertain. Short-term interval follow-up imaging is recommended for surveillance.      2. Periventricular microangiopathic ischemic changes, chronic small vessel disease   3. Age-related atrophic changes         Critical results reported to Physician: Jody Tobar at 8:12 AM on 7/21/2024.       Electronically signed by Will Colin MD            Medical Decision Making:  The following items were considered in medical decision making:  Discussion of patient care with other providers  Reviewed clinical lab tests  Reviewed radiology tests  Reviewed other diagnostic tests/interventions    Will be discussed w/  Primary team     Thank you for allowing us to participate in care of Zully Escudero   Feel free to contact me,     Rossana Martinez MD   [facility]  Office : [phone] or through [employer]  Fax :[phone]

## 2024-07-22 NOTE — PLAN OF CARE
Problem: Pain  Goal: Verbalizes/displays adequate comfort level or baseline comfort level  7/22/2024 0750 by Catarina Durbin, RN  Outcome: Progressing   Pt endorsing pain to head. Being treated with PRN pain medication, rest, and frequent repositioning with pillow support for comfort and pressure relief. Pt reports some relief from pain with above interventions.    Problem: Safety - Adult  Goal: Free from fall injury  7/22/2024 0750 by Catarina Durbin, RN  Outcome: Progressing   All fall precautions in place. Bed locked and in lowest position with alarm on. Overbed table and personal belonings within reach. Call light within reach and patient instructed to use call light for assistance. Non-skid socks on.

## 2024-07-22 NOTE — PROGRESS NOTES
V2.0    [facility] [place] Progress Note      Name:  Zully Escudero /Age/Sex: 1931  (89 y.o. female)   MRN & CSN:  [id number] & [id number] Encounter Date/Time: 2024 9:23 AM EDT   [facility] PCP: No primary care provider on file.     Attending:Suyapa Guo MD       Hospital Day: 2    Assessment and Recommendations   Zully Escudero is a 89 y.o. female with a pmh of irritable bowel syndrome, coronary artery disease, hypertension who presents with Subdural hematoma (HCC)     Plan:     Subdural hematoma  -CT head shows right tentorial subdural hematoma 5.3 mm.  Repeat CT unchanged  -Neurosurgery consulted  -hold anticoagulation  -Further recommendation per neurosurgery     Mechanical fall  -PT OT consulted     Hyponatremia-improving  -Serum osmolality 268, urine osmolality 230.  Urine sodium 44.  Unlikely SIADH  -TSH mildly elevated  -Nephrology consulted  -Baseline sodium 131     Hypothyroidism  -Continue home meds     Diarrhea history of IBS  -Patient has chronic diarrhea with some worsening 1 week ago.  -If diarrhea persists will get C. difficile test  -Patient is already established with GI  -History of antibiotic course UTI     Coronary artery disease  -Hold aspirin  -Statin     Hypertension  -Monitor blood pressure continue home     Goals of care discussed with daughter.  Patient is a full code       I spent > 55  minutes in the care of this patient.  Over 50% of that time was in face-to-face counseling regarding disease process, diagnostic testing, preventative measures, and answering patient and family questions.     Diet ADULT DIET; Regular; 1200 ml   DVT Prophylaxis [] Lovenox, []  Heparin, [] SCDs, [] Ambulation,  [] Eliquis, [] Xarelto  [] Coumadin   Code Status Full Code   Disposition From: home  Expected Disposition: home  Estimated Date of Discharge: 2-3 days  Patient requires continued admission due to SDH   Surrogate Decision Maker/ POA  Daughter      Personally

## 2024-07-22 NOTE — PLAN OF CARE
Problem: Pain  Goal: Verbalizes/displays adequate comfort level or baseline comfort level  7/21/2024 2032 by Abdullahi Callaway, RN  Outcome: Progressing   Pt denies pain at this time. Encourage pt to call if pain is noted. Will continue to monitor.    Problem: Safety - Adult  Goal: Free from fall injury  7/21/2024 2032 by Abdullahi Callaway, RN  Outcome: Progressing   Pt remains free from injury during this shift. Pt is up with assist x 1, gait belt and walker. Encourage pt to call for all assistance. Call light is in reach, bed alarm is activated for safety, bed locked and in lowest position. Will continue to monitor.    Problem: Skin/Tissue Integrity  Goal: Absence of new skin breakdown  Outcome: Progressing   Pt has scattered bruising and blanchable redness to her buttocks. Pt is on a low air loss mattress. HOB 30 degrees. Will encourage pt to turn frequently. Will continue to monitor.

## 2024-07-22 NOTE — CONSULTS
Nephrology Consult Note                                                                                                                                                                                                                                                                                                                                                               Office : [phone]     Fax :[phone]    Patient's Name: Zully Escudero  9:25 PM  7/21/2024    Reason for Consult:  hyponatremia   Requesting Physician:  No primary care provider on file.  Chief Complaint:    Chief Complaint   Patient presents with    Fall    Head Injury       Assessment/Plan     # Hyponatremia   Moderate   Hypotonic   Normovolemic   Low U FE  U osm low/ normal   TSH 14  A/P  Hypo Na more consistent with tea&toast but midl SIADH vs hypothyroid can still, be affecting.   Already corrected 6 meq in 12 hours.   I am limited in d5w due to SDH   Plan:  Keep monitor Na q4   Goal tomorrow 8: 130. I will tolerate 470314 before lower Na with DDAVP   Remove fluid restriction for nwo   U lytes tomorrow     # SDH  Mild   No intervention expected  Per NS/ primary team     # HTN   Controlled  Treat with NS     # Hypothyroidism   Not controlled  Synthroid per primary team     History of Present Ilness:    Zully Escudero is a 89 y.o. female with  pmh of irritable bowel syndrome, coronary artery disease, hypertension who presents with Subdural hematoma (HCC)   Pt had a mechanical fall and was diagnosed with SDH 5 mm and Na 122. Was started on LR (stopped) after feeling dizzy and not well   Currently no dizziness, mild headache   Pt denies CP/SOB/palpitations/abdominal pain/N/V.   Pt denies fever/ chills  Pt denies dysuria or hematuria     Na 122 (08 AM) --> water restriction---> 128 (08 PM).   U osm 230   U FE 66   Usm low     Interval hx     Past Medical History:   Diagnosis Date    Cerebral artery occlusion with cerebral

## 2024-07-22 NOTE — PROGRESS NOTES
Pt is alert and oriented. VSS. NIH 0. Pt ambulated fairly well, gait belt and walker. All safety measure are in place. Will continue to monitor.

## 2024-07-23 LAB
SODIUM SERPL-SCNC: 123 MMOL/L (ref 136–145)
SODIUM SERPL-SCNC: 124 MMOL/L (ref 136–145)
SODIUM SERPL-SCNC: 125 MMOL/L (ref 136–145)
SODIUM SERPL-SCNC: 127 MMOL/L (ref 136–145)
SODIUM SERPL-SCNC: 128 MMOL/L (ref 136–145)

## 2024-07-23 PROCEDURE — 84295 ASSAY OF SERUM SODIUM: CPT

## 2024-07-23 PROCEDURE — 2580000003 HC RX 258: Performed by: INTERNAL MEDICINE

## 2024-07-23 PROCEDURE — 6370000000 HC RX 637 (ALT 250 FOR IP): Performed by: INTERNAL MEDICINE

## 2024-07-23 PROCEDURE — 36415 COLL VENOUS BLD VENIPUNCTURE: CPT

## 2024-07-23 PROCEDURE — 2060000000 HC ICU INTERMEDIATE R&B

## 2024-07-23 PROCEDURE — 6360000002 HC RX W HCPCS: Performed by: INTERNAL MEDICINE

## 2024-07-23 RX ADMIN — Medication 15 G: at 08:07

## 2024-07-23 RX ADMIN — LOSARTAN POTASSIUM 100 MG: 25 TABLET, FILM COATED ORAL at 08:05

## 2024-07-23 RX ADMIN — ACETAMINOPHEN 650 MG: 325 TABLET ORAL at 00:47

## 2024-07-23 RX ADMIN — PREGABALIN 25 MG: 25 CAPSULE ORAL at 08:05

## 2024-07-23 RX ADMIN — ATORVASTATIN CALCIUM 40 MG: 40 TABLET, FILM COATED ORAL at 08:05

## 2024-07-23 RX ADMIN — HYDRALAZINE HYDROCHLORIDE 10 MG: 20 INJECTION INTRAMUSCULAR; INTRAVENOUS at 00:17

## 2024-07-23 RX ADMIN — Medication 15 G: at 20:00

## 2024-07-23 RX ADMIN — ATENOLOL 50 MG: 50 TABLET ORAL at 20:00

## 2024-07-23 RX ADMIN — ATENOLOL 50 MG: 50 TABLET ORAL at 08:06

## 2024-07-23 RX ADMIN — VASOPRESSIN 50 ML/HR: 20 INJECTION, SOLUTION INTRAVENOUS at 09:47

## 2024-07-23 RX ADMIN — ACETAMINOPHEN 650 MG: 325 TABLET ORAL at 06:39

## 2024-07-23 RX ADMIN — SODIUM CHLORIDE, PRESERVATIVE FREE 10 ML: 5 INJECTION INTRAVENOUS at 08:07

## 2024-07-23 RX ADMIN — LEVOTHYROXINE SODIUM 75 MCG: 0.07 TABLET ORAL at 06:39

## 2024-07-23 RX ADMIN — PREGABALIN 25 MG: 25 CAPSULE ORAL at 20:00

## 2024-07-23 ASSESSMENT — PAIN DESCRIPTION - DESCRIPTORS
DESCRIPTORS: ACHING
DESCRIPTORS: THROBBING
DESCRIPTORS: ACHING

## 2024-07-23 ASSESSMENT — PAIN DESCRIPTION - ONSET
ONSET: GRADUAL
ONSET: GRADUAL

## 2024-07-23 ASSESSMENT — PAIN SCALES - GENERAL
PAINLEVEL_OUTOF10: 3
PAINLEVEL_OUTOF10: 4
PAINLEVEL_OUTOF10: 6
PAINLEVEL_OUTOF10: 3
PAINLEVEL_OUTOF10: 0

## 2024-07-23 ASSESSMENT — PAIN DESCRIPTION - ORIENTATION
ORIENTATION: ANTERIOR;POSTERIOR

## 2024-07-23 ASSESSMENT — PAIN DESCRIPTION - LOCATION
LOCATION: HEAD

## 2024-07-23 ASSESSMENT — PAIN DESCRIPTION - PAIN TYPE
TYPE: ACUTE PAIN
TYPE: ACUTE PAIN

## 2024-07-23 ASSESSMENT — PAIN - FUNCTIONAL ASSESSMENT: PAIN_FUNCTIONAL_ASSESSMENT: PREVENTS OR INTERFERES SOME ACTIVE ACTIVITIES AND ADLS

## 2024-07-23 ASSESSMENT — PAIN DESCRIPTION - FREQUENCY
FREQUENCY: INTERMITTENT
FREQUENCY: INTERMITTENT

## 2024-07-23 NOTE — PROGRESS NOTES
[facility]- pt is not there  Na 123  Plan:  2Na Cl 25 100 cc   Urea 15 gr bid  Water restriction   Repeat urine lytes   I will see the pt in PM   Rossana Martinez MD

## 2024-07-23 NOTE — CARE COORDINATION
Patient plans to return to daughters home at discharge.  No CM needs at this time.  Family to transport.    Aida Devine RN, BSN,   5T Ortho/Neuro   [phone]

## 2024-07-23 NOTE — PROGRESS NOTES
Pt a/o x4. VSS on RA. Pain managed with tylenol per MAR. Tolerating ambulation well x1 with a walker. Fall precautions are in place.

## 2024-07-23 NOTE — PROGRESS NOTES
V2.0    [facility]      Name:  Zully Escudero /Age/Sex: 1931  (89 y.o. female)   MRN & CSN:  [id number] & [id number] Encounter Date/Time: 2024 9:23 AM EDT   Location:  550/5502-01 PCP: No primary care provider on file.     Attending:Suyapa Guo MD       Hospital Day: 3    Assessment and Recommendations   Zully Escudero is a 89 y.o. female with a pmh of irritable bowel syndrome, coronary artery disease, hypertension who presents with Subdural hematoma (HCC) secondary to fall.  Repeat CT was unchanged.  Neurosurgery was consulted.  No further management.  Patient was found to have hyponatremia.  Workup was not conclusive for SIADH.    Plan:     Subdural hematoma  -CT head shows right tentorial subdural hematoma 5.3 mm.  Repeat CT unchanged  -Neurosurgery consulted  -hold anticoagulation  -Further recommendation per neurosurgery     Mechanical fall  -PT OT consulted     Hyponatremia-improving, sodium trending down , discussed with nephrology, started on 2% saline.  Urea dose increased  -Serum osmolality 268, urine osmolality 230.  Urine sodium 44.  Unlikely SIADH  -TSH mildly elevated, free T4 normal  -Nephrology consulted  -Baseline sodium 131     Hypothyroidism  -Continue home meds     Diarrhea history of IBS  -Patient has chronic diarrhea with some worsening 1 week ago.  -If diarrhea persists will get C. difficile test  -Patient is already established with GI  -History of antibiotic course UTI     Coronary artery disease  -Hold aspirin  -Statin     Hypertension  -Monitor blood pressure continue home     Goals of care discussed with daughter.  Patient is a full code       I spent > 55  minutes in the care of this patient.  Over 50% of that time was in face-to-face counseling regarding disease process, diagnostic testing, preventative measures, and answering patient and family questions.     Diet ADULT DIET; Regular; 1200 ml   DVT Prophylaxis [] Lovenox, []  Heparin,  is noted on sagittal image 17. Diffuse periventricular white matter hypodensity consistent with microangiopathic small vessel ischemic changes. No evidence of intra-axial hemorrhage. Posterior fossa is within normal limits Paranasal sinuses are unremarkable. Orbits: None Mastoids and Temporal Bones: None Skull base and Bony calvarium remains intact, no destructive lesions.     1. Right tentorial subdural hematoma measuring 5.3 mm in thickness. There is a central area of lucency on image 43. Whether this represents a small \"swirl sign\" is uncertain. Short-term interval follow-up imaging is recommended for surveillance. 2. Periventricular microangiopathic ischemic changes, chronic small vessel disease 3. Age-related atrophic changes Critical results reported to Physician: Jody Tobar at 8:12 AM on 7/21/2024. Electronically signed by Will Colin MD      CBC:   Recent Labs     07/21/24  0812   WBC 6.2   HGB 11.7*        BMP:    Recent Labs     07/21/24  0812 07/21/24  1252 07/22/24  0155 07/22/24  0726 07/22/24 2016 07/23/24  0344 07/23/24  0707   *   < > 123*   < > 127* 124* 123*   K 4.8  --  4.2  --   --   --   --    CL 88*  --  88*  --   --   --   --    CO2 23  --  22  --   --   --   --    BUN 10  --  10  --   --   --   --    CREATININE <0.5*  --  <0.5*  --   --   --   --    GLUCOSE 105*  --  114*  --   --   --   --     < > = values in this interval not displayed.     Hepatic:   Recent Labs     07/21/24  0812   AST 28   ALT 20   BILITOT 0.5   ALKPHOS 63     Lipids: No results found for: \"CHOL\", \"HDL\", \"TRIG\"  Hemoglobin A1C: No results found for: \"LABA1C\"  TSH:   Lab Results   Component Value Date/Time    TSH 14.84 07/21/2024 08:12 AM     Troponin: No results found for: \"TROPONINT\"  Lactic Acid: No results for input(s): \"LACTA\" in the last 72 hours.  BNP: No results for input(s): \"PROBNP\" in the last 72 hours.  UA:  Lab Results   Component Value Date/Time    NITRU Negative 07/21/2024 03:26

## 2024-07-24 LAB
SODIUM SERPL-SCNC: 124 MMOL/L (ref 136–145)
SODIUM SERPL-SCNC: 127 MMOL/L (ref 136–145)
SODIUM SERPL-SCNC: 128 MMOL/L (ref 136–145)

## 2024-07-24 PROCEDURE — 2060000000 HC ICU INTERMEDIATE R&B

## 2024-07-24 PROCEDURE — 84295 ASSAY OF SERUM SODIUM: CPT

## 2024-07-24 PROCEDURE — 36415 COLL VENOUS BLD VENIPUNCTURE: CPT

## 2024-07-24 PROCEDURE — 6370000000 HC RX 637 (ALT 250 FOR IP): Performed by: INTERNAL MEDICINE

## 2024-07-24 PROCEDURE — 2580000003 HC RX 258: Performed by: INTERNAL MEDICINE

## 2024-07-24 PROCEDURE — 99232 SBSQ HOSP IP/OBS MODERATE 35: CPT | Performed by: INTERNAL MEDICINE

## 2024-07-24 RX ORDER — SODIUM CHLORIDE 1 G/1
1 TABLET ORAL
Status: DISCONTINUED | OUTPATIENT
Start: 2024-07-24 | End: 2024-07-25 | Stop reason: HOSPADM

## 2024-07-24 RX ADMIN — ATORVASTATIN CALCIUM 40 MG: 40 TABLET, FILM COATED ORAL at 08:41

## 2024-07-24 RX ADMIN — Medication 15 G: at 08:42

## 2024-07-24 RX ADMIN — LEVOTHYROXINE SODIUM 75 MCG: 0.07 TABLET ORAL at 06:50

## 2024-07-24 RX ADMIN — LOSARTAN POTASSIUM 100 MG: 25 TABLET, FILM COATED ORAL at 08:41

## 2024-07-24 RX ADMIN — PREGABALIN 25 MG: 25 CAPSULE ORAL at 20:44

## 2024-07-24 RX ADMIN — SODIUM CHLORIDE 1 G: 1 TABLET ORAL at 12:23

## 2024-07-24 RX ADMIN — SODIUM CHLORIDE 1 G: 1 TABLET ORAL at 16:39

## 2024-07-24 RX ADMIN — SODIUM CHLORIDE, PRESERVATIVE FREE 10 ML: 5 INJECTION INTRAVENOUS at 08:41

## 2024-07-24 RX ADMIN — ATENOLOL 50 MG: 50 TABLET ORAL at 20:44

## 2024-07-24 RX ADMIN — PREGABALIN 25 MG: 25 CAPSULE ORAL at 08:41

## 2024-07-24 NOTE — PROGRESS NOTES
Patient is alert and oriented x4. VSS on room air. Medications given per MAR, no side effects noted. Patient ambulating x1 with gait belt and walker. No complaints of pain, continuing to monitor and manage per MAR. Voiding well via BRP, no bm this shift.     Patient is currently resting in bed with bed alarm on for safety. Call light within reach and all fall precautions in place. Plan of care continues.    Electronically signed by Catarina Durbin RN on 7/24/2024 at 7:33 PM

## 2024-07-24 NOTE — PROGRESS NOTES
Nephrology Consult Note                                                                                                                                                                                                                                                                                                                                                               Office : [phone]     Fax :[phone]    Patient's Name: Zully Escudero  11:03 AM  7/24/2024    Reason for Consult:  hyponatremia   Requesting Physician:  No primary care provider on file.  Chief Complaint:    Chief Complaint   Patient presents with    Fall    Head Injury       Assessment/Plan     # Hyponatremia   Moderate   Hypotonic   Normovolemic   Low U FE  U osm low/ normal   TSH 14  Bimodalcorrection   A/P  SIADH   Plan:  Give Na Cl 2% 125 cc   If > 130 later- can be discharged on salt tablets   fluid restriction fo  F/u with nephrology in 1 week with repeat BMP 3-4 days after discharge     # SDH  Mild   No intervention expected  Per NS     # HTN  controlled  On Atenolol, Losartan   No meds changes     # Hypothyroidism   Not controlled  Synthroid per primary team     History of Present Ilness:    Zully Escudero is a 89 y.o. female with  pmh of irritable bowel syndrome, coronary artery disease, hypertension who presents with Subdural hematoma (HCC)   Pt had a mechanical fall and was diagnosed with SDH 5 mm and Na 122. Was started on LR (stopped) after feeling dizzy and not well   Currently no dizziness, mild headache   Pt denies CP/SOB/palpitations/abdominal pain/N/V.   Pt denies fever/ chills  Pt denies dysuria or hematuria     Na 122 (08 AM) --> water restriction---> 127 thiS am   On urea 15 gr bid- pt does not tolerate well   U osm 297  U FE 80    Interval hx   Na 122 (08 AM) --> water restriction---> 127 thiS am   On urea 15 gr bid- pt does not tolerate well   U osm 297  U FE 80  Past Medical History:   Diagnosis  wnl  Heent:  eomi, mmm  Neck: no bruits or jvd noted  Cardiovascular:  S1, S2 without m/r/g  Respiratory: CTA B without w/r/r  Abdomen:  , soft, nt, nd  Ext:  lower extremity edema No  Psychiatric: mood and affect normal   Musculoskeletal:  Rom, muscular strength intact    Data:   Labs:  CBC:   No results for input(s): \"WBC\", \"HGB\", \"PLT\" in the last 72 hours.    BMP:    Recent Labs     07/22/24  0155 07/22/24  0726 07/23/24  2133 07/24/24  0051 07/24/24  0630   *   < > 127* 128* 127*   K 4.2  --   --   --   --    CL 88*  --   --   --   --    CO2 22  --   --   --   --    BUN 10  --   --   --   --    CREATININE <0.5*  --   --   --   --    GLUCOSE 114*  --   --   --   --     < > = values in this interval not displayed.     Ca/Mg/Phos:   Recent Labs     07/22/24  0155   CALCIUM 8.6     Hepatic:   No results for input(s): \"AST\", \"ALT\", \"BILITOT\", \"ALKPHOS\" in the last 72 hours.    Invalid input(s): \"ALB\"    Troponin: No results for input(s): \"TROPONINI\" in the last 72 hours.  BNP: No results for input(s): \"BNP\" in the last 72 hours.  Lipids: No results for input(s): \"CHOL\", \"TRIG\", \"HDL\" in the last 72 hours.    Invalid input(s): \"LDLCALC\", \"LABVLDL\"  ABGs: No results for input(s): \"PHART\", \"PO2ART\", \"ESY1NCZ\" in the last 72 hours.  INR:   No results for input(s): \"INR\" in the last 72 hours.    UA:  Recent Labs     07/21/24  1526   COLORU Yellow   CLARITYU Clear   GLUCOSEU Negative   BILIRUBINUR Negative   KETUA Negative   BLOODU Negative   PHUR 7.0   PROTEINU Negative   UROBILINOGEN 0.2   NITRU Negative   LEUKOCYTESUR Negative   URINETYPE NotGiven      Urine Microscopic: No results for input(s): \"LABCAST\", \"BACTERIA\", \"COMU\", \"HYALCAST\", \"WBCUA\", \"RBCUA\" in the last 72 hours.    Invalid input(s): \"EPIU\"  Urine Culture:   Recent Labs     07/21/24  1526   LABURIN No growth at 18 to 36 hours     Urine Chemistry:   Recent Labs     07/21/24  1526 07/22/24  1327   NAUR 44 44         IMAGING:  CT HEAD WO CONTRAST

## 2024-07-24 NOTE — PLAN OF CARE
Problem: Pain  Goal: Verbalizes/displays adequate comfort level or baseline comfort level  7/24/2024 0927 by Catarina Durbin, RN  Outcome: Progressing   No complaints of pain at this time, continuing to monitor and manage per MAR.     Problem: Safety - Adult  Goal: Free from fall injury  7/24/2024 0927 by Catarina Durbin, RN  Outcome: Progressing   All fall precautions in place. Bed locked and in lowest position with alarm on. Overbed table and personal belonings within reach. Call light within reach and patient instructed to use call light for assistance. Non-skid socks on.

## 2024-07-24 NOTE — PROGRESS NOTES
V2.0    [facility] Progress Note      Name:  Zully Escudero /Age/Sex: 1931  (89 y.o. female)   MRN & CSN:  [id number] & [id number] Encounter Date/Time: 2024 9:23 AM EDT   Location:  550/5502-01 PCP: No primary care provider on file.     Attending:Suyapa Guo MD       Hospital Day: 4    Assessment and Recommendations   Zully Escudero is a 89 y.o. female with a pmh of irritable bowel syndrome, coronary artery disease, hypertension who presents with Subdural hematoma (HCC) secondary to fall.  Repeat CT was unchanged.  Neurosurgery was consulted.  No further management.  Patient was found to have hyponatremia.  Workup was not conclusive for SIADH.  Initially patient sodium improved then trended down.  Nephrology started the patient on urea with 2%  saline with improvement    Plan:     Subdural hematoma  -CT head shows right tentorial subdural hematoma 5.3 mm.  Repeat CT unchanged  -Neurosurgery consulted  -hold anticoagulation  -Further recommendation per neurosurgery     Mechanical fall  -PT OT consulted     Hyponatremia-improving , discussed with nephrology, started on 2% saline.  Urea dose increased  -Serum osmolality 268, urine osmolality 230.  Urine sodium 44.  Unlikely SIADH  -TSH mildly elevated, free T4 normal  -Nephrology consulted  -Baseline sodium 131     Hypothyroidism  -Continue home meds     Diarrhea history of IBS  -Patient has chronic diarrhea with some worsening 1 week ago.  -If diarrhea persists will get C. difficile test  -Patient is already established with GI  -History of antibiotic course UTI     Coronary artery disease  -Hold aspirin  -Statin     Hypertension  -Monitor blood pressure continue home     Goals of care discussed with daughter.  Patient is a full code       I spent > 55  minutes in the care of this patient.  Over 50% of that time was in face-to-face counseling regarding disease process, diagnostic testing, preventative measures, and answering  the cervical spine. Axial images and multiplanar reformatted images reviewed. CT radiation dose optimization techniques (automated exposure control, use of a iterative reconstruction techniques, or adjustment of the mA or KV according to the patients size) were used to limit patient radiation dose IV contrast: None. FINDINGS: Cerebellar tonsils: Normal. Alignment: Levoscoliosis. Grade 1 anterolisthesis of C4-C5 and C7 on T1 Bones: No fracture or destructive osseous process. Neck soft tissues: Normal. Visualized lung apices and mediastinum: Normal. C2-C3: Normal disc annulus. No central canal or foraminal stenosis C3-C4: Mild bulging disc annulus. No significant central canal stenosis. Bilateral facet arthropathy. No significant foraminal stenosis. C4-C5: Degenerative anterolisthesis. Defect or whole bilateral foraminal stenosis, right greater than left. Patent central canal. Normal disc annulus. C5-C6: Posterior annular hyperostosis. No significant central canal stenosis. Foramina patent bilaterally C6-C7: Unremarkable C7-T1: Unremarkable     Degenerative changes, spondylosis. 2. No evidence of acute fracture or dislocation Levoscoliosis Electronically signed by Will Colin MD    CT Head W/O Contrast    Result Date: 7/21/2024  EXAM: COMPUTED TOMOGRAPHY OF THE BRAIN Noncontrast. INDICATIONS: Trauma, hematoma COMPARISON: None TECHNICAL FACTORS: Multiplanar contiguous spiral axial scanning Skull base to high convexity with multi-reformatted images. CT radiation dose optimization techniques (automated exposure control, use of a iterative reconstruction techniques, or adjustment of the mA or KV according to the patients size) were used to limit patient radiation dose.] FINDINGS: Noncontrast axial images reveal midline ventricles. Ventricular size and cortical sulci are  prominent compatible with age-related atrophic changes. Thickening of the right tentorium measuring 5.7 mm, coronal series 603 image 45, tentorial

## 2024-07-24 NOTE — CARE COORDINATION
Patient is from home, currently staying with daughter, plans to return at discharge.  No CM needs at this time.  Family to transport.    Aida Devine RN, BSN,    Ortho/Neuro   [phone]

## 2024-07-24 NOTE — PROGRESS NOTES
Pt is alert and oriented. VSS. Neuro checks are WDL. No c/o pain this shift. Pt tolerating medication Urea poorly due to the taste but, pt manages to get it down. All safety measures are in place. Will continue to monitor.

## 2024-07-24 NOTE — PLAN OF CARE
Problem: Pain  Goal: Verbalizes/displays adequate comfort level or baseline comfort level  Outcome: Progressing   Pt denies pain during this shift. Encourage pt to call if pain is noted. Will continue to monitor.    Problem: Safety - Adult  Goal: Free from fall injury  7/23/2024 2106 by Abdullahi Callaway RN  Outcome: Progressing   Pt remains free from injury during this shift. Pt is up with assist x 1 person, gait belt and walker. Encourage pt to call for all assistance. Call light is in reach, bed alarm is activated for safety, bed locked and in lowest position. Will continue to monitor.

## 2024-07-25 VITALS
DIASTOLIC BLOOD PRESSURE: 68 MMHG | BODY MASS INDEX: 25.5 KG/M2 | HEIGHT: 58 IN | HEART RATE: 61 BPM | RESPIRATION RATE: 18 BRPM | TEMPERATURE: 98.2 F | WEIGHT: 121.5 LBS | SYSTOLIC BLOOD PRESSURE: 121 MMHG | OXYGEN SATURATION: 98 %

## 2024-07-25 LAB
SODIUM SERPL-SCNC: 129 MMOL/L (ref 136–145)
SODIUM SERPL-SCNC: 129 MMOL/L (ref 136–145)
SODIUM SERPL-SCNC: 130 MMOL/L (ref 136–145)

## 2024-07-25 PROCEDURE — 6370000000 HC RX 637 (ALT 250 FOR IP): Performed by: INTERNAL MEDICINE

## 2024-07-25 PROCEDURE — 36415 COLL VENOUS BLD VENIPUNCTURE: CPT

## 2024-07-25 PROCEDURE — 84295 ASSAY OF SERUM SODIUM: CPT

## 2024-07-25 RX ORDER — SODIUM CHLORIDE 1 G/1
1 TABLET ORAL
Qty: 90 TABLET | Refills: 3 | Status: SHIPPED | OUTPATIENT
Start: 2024-07-25

## 2024-07-25 RX ADMIN — ATORVASTATIN CALCIUM 40 MG: 40 TABLET, FILM COATED ORAL at 08:50

## 2024-07-25 RX ADMIN — LOSARTAN POTASSIUM 100 MG: 25 TABLET, FILM COATED ORAL at 08:50

## 2024-07-25 RX ADMIN — LEVOTHYROXINE SODIUM 75 MCG: 0.07 TABLET ORAL at 06:41

## 2024-07-25 RX ADMIN — SODIUM CHLORIDE 1 G: 1 TABLET ORAL at 08:50

## 2024-07-25 RX ADMIN — PREGABALIN 25 MG: 25 CAPSULE ORAL at 08:50

## 2024-07-25 NOTE — DISCHARGE SUMMARY
recommendations, medications, and plan.     Consults this admission:  IP CONSULT TO NEUROSURGERY  IP CONSULT TO HOSPITALIST  IP CONSULT TO NEPHROLOGY    Discharge Diagnosis:   Subdural hematoma (HCC)  Hyponatremia    Discharge Instruction:   Follow up appointments:   Primary care physician: No primary care provider on file. within 1 week  Diet: Regular diet and fluid restrict  Activity: activity as tolerated  Disposition: Discharged to:   [x]Home, [][facility], []SNF, []Acute Rehab, []Hospice   Condition on discharge: Stable  Labs and Tests to be Followed up as an outpatient by PCP or Specialist: Nephrology in 1 week    Discharge Medications:        Medication List        START taking these medications      sodium chloride 1 g tablet  Take 1 tablet by mouth 3 times daily (with meals)            CONTINUE taking these medications      atenolol 50 MG tablet  Commonly known as: TENORMIN     atorvastatin 40 MG tablet  Commonly known as: LIPITOR     levothyroxine 75 MCG tablet  Commonly known as: SYNTHROID     losartan 100 MG tablet  Commonly known as: COZAAR     lubiprostone 24 MCG capsule  Commonly known as: AMITIZA     pregabalin 25 MG capsule  Commonly known as: LYRICA     Vitamin D3 50 MCG (2000 UT) Caps            STOP taking these medications      aspirin 81 MG chewable tablet     sertraline 25 MG tablet  Commonly known as: ZOLOFT               Where to Get Your Medications        These medications were sent to [facility] [id number] - P [phone] - F [phone]  [address] [place] [address]      Phone: [phone]   sodium chloride 1 g tablet        Objective Findings at Discharge:   /79   Pulse 63   Temp 98.2 °F (36.8 °C) (Oral)   Resp 18   Ht 1.473 m (4' 10\")   Wt 55.1 kg (121 lb 8 oz)   SpO2 97%   BMI 25.39 kg/m²       Physical Exam:     General: NAD  Eyes: EOMI  ENT: neck supple  Cardiovascular: Regular rate.  Respiratory: Clear to  \"BLOODCULT2\"  Organism: No results found for: \"ORG\"    Time Spent Discharging patient 45  minutes    Electronically signed by Suyapa Guo MD on 7/25/2024 at 11:56 AM

## 2024-07-25 NOTE — PROGRESS NOTES
All discharge instructions reviewed with patient. All questions and concerns addressed at this time. Patient belongings gathered. Patient taken downstairs via wheelchair to the lobby to be picked up for discharge. Patient educated on follow up appointments and home medications, all questions answered.     Electronically signed by Catarina Durbin RN on 7/25/2024 at 3:20 PM

## 2024-07-25 NOTE — PLAN OF CARE
Problem: Discharge Planning  Goal: Discharge to home or other facility with appropriate resources  Outcome: Progressing   Pt involved in discharge planning. Barriers to discharge discussed with patient. Discharge learning needs identified. Discuss with patient any additional needed resources and transportation plans. Case management following plan of care.    Problem: Safety - Adult  Goal: Free from fall injury  7/25/2024 0926 by Catarina Durbin RN  Outcome: Progressing   All fall precautions in place. Bed locked and in lowest position with alarm on. Overbed table and personal belonings within reach. Call light within reach and patient instructed to use call light for assistance. Non-skid socks on.

## 2024-07-25 NOTE — PLAN OF CARE
Problem: Pain  Goal: Verbalizes/displays adequate comfort level or baseline comfort level  Outcome: Progressing   Pt denies pain during this shift. Pt is encouraged to call if pain is noted. Will continue to monitor.    Problem: Safety - Adult  Goal: Free from fall injury  Outcome: Progressing   Pt remains free from injury during this shift. Pt is up with assist x 1 person, gait belt and walker. Encourage pt to call for all assistance. Call light is in reach for safety, bed locked and in lowest position. Will continue to monitor.    Problem: Skin/Tissue Integrity  Goal: Absence of new skin breakdown  Outcome: Progressing   Pt has scattered bruising. Pt also suffers from hemorrhoids. Barrier cream wipes used after voiding. Pt currently on low air loss mattress and pt is encouraged to turn frequently.

## 2024-07-25 NOTE — PROGRESS NOTES
represent nonclotted blood on image 43, possible swirl sign. This is noted on sagittal image 17. Diffuse periventricular white matter hypodensity consistent with microangiopathic small vessel ischemic changes. No evidence of intra-axial hemorrhage. Posterior fossa is within normal limits Paranasal sinuses are unremarkable. Orbits: None Mastoids and Temporal Bones: None Skull base and Bony calvarium remains intact, no destructive lesions.     1. Right tentorial subdural hematoma measuring 5.3 mm in thickness. There is a central area of lucency on image 43. Whether this represents a small \"swirl sign\" is uncertain. Short-term interval follow-up imaging is recommended for surveillance. 2. Periventricular microangiopathic ischemic changes, chronic small vessel disease 3. Age-related atrophic changes Critical results reported to Physician: Jody Tobar at 8:12 AM on 7/21/2024. Electronically signed by Will Colin MD      CBC:   No results for input(s): \"WBC\", \"HGB\", \"PLT\" in the last 72 hours.    BMP:    Recent Labs     07/24/24 2126 07/25/24  0036 07/25/24  0524   * 129* 129*     Hepatic:   No results for input(s): \"AST\", \"ALT\", \"BILITOT\", \"ALKPHOS\" in the last 72 hours.    Invalid input(s): \"ALB\"    Lipids: No results found for: \"CHOL\", \"HDL\", \"TRIG\"  Hemoglobin A1C: No results found for: \"LABA1C\"  TSH:   Lab Results   Component Value Date/Time    TSH 14.84 07/21/2024 08:12 AM     Troponin: No results found for: \"TROPONINT\"  Lactic Acid: No results for input(s): \"LACTA\" in the last 72 hours.  BNP: No results for input(s): \"PROBNP\" in the last 72 hours.  UA:  Lab Results   Component Value Date/Time    NITRU Negative 07/21/2024 03:26 PM    COLORU Yellow 07/21/2024 03:26 PM    PHUR 7.0 07/21/2024 03:26 PM    CLARITYU Clear 07/21/2024 03:26 PM    LEUKOCYTESUR Negative 07/21/2024 03:26 PM    UROBILINOGEN 0.2 07/21/2024 03:26 PM    BILIRUBINUR Negative 07/21/2024 03:26 PM    BLOODU Negative 07/21/2024 03:26  PM    GLUCOSEU Negative 07/21/2024 03:26 PM    KETUA Negative 07/21/2024 03:26 PM     Urine Cultures:   Lab Results   Component Value Date/Time    LABURIN No growth at 18 to 36 hours 07/21/2024 03:26 PM     Blood Cultures: No results found for: \"BC\"  No results found for: \"BLOODCULT2\"  Organism: No results found for: \"ORG\"      Electronically signed by Suyapa Guo MD on 7/25/2024 at 8:42 AM  Comment: Please note this report has been produced using speech recognition software and may contain errors related to that system including errors in grammar, punctuation, and spelling, as well as words and phrases that may be inappropriate. If there are any questions or concerns, please feel free to contact the dictating provider for clarification.

## 2024-07-25 NOTE — CARE COORDINATION
Case Management Assessment            Discharge Note                    Date / Time of Note: 7/25/2024 11:59 AM                  Discharge Note Completed by: Aida Devine RN    Patient Name: Zully Escudero   YOB: 1931  Diagnosis: Subdural hematoma (HCC) [S06.5XAA]   Date / Time: 7/21/2024  7:16 AM    Current PCP: No primary care provider on file.  Clinic patient: No    Hospitalization in the last 30 days: No       Advance Directives:  Code Status: Full Code  [place] DNR form completed and on chart: Not Indicated    Financial:  Payor: MEDICARE / Plan: MEDICARE PART A AND B / Product Type: *No Product type* /      Pharmacy:    [facility] [id number] [address] -  [phone] - [facility] [phone]  [address] [place] [address]  Phone: [phone] Fax: [phone]      Assistance purchasing medications?:    Assistance provided by Case Management: None at this time    Does patient want to participate in local refill/ meds to beds program?:      Meds To Beds General Rules:  1. Can ONLY be done Monday- Friday between 8:30am-5pm  2. Prescription(s) must be in pharmacy by 3pm to be filled same day  3.Copy of patient's insurance/ prescription drug card and patient face sheet must be sent along with the prescription(s)  4. Cost of Rx cannot be added to hospital bill. If financial assistance is needed, please contact unit  or ;  or  CANNOT provide pharmacy voucher for patients co-pays  5. Patients can then  the prescription on their way out of the hospital at discharge, or pharmacy can deliver to the bedside if staff is available. (payment due at time of pick-up or delivery - cash, check, or card accepted)     Able to afford home medications/ co-pay costs: Yes    ADLS:  Current PT AM-PAC Score: 22 /24  Current OT AM-PAC Score: 22 /24    DISCHARGE Disposition: Home- No Services Needed    LOC at

## 2024-07-25 NOTE — PROGRESS NOTES
Pt is alert and oriented. VSS. Pt still having Na draws most recent at 129. Pt is encouraged to talk about dietary suggestion. Pt verbalized understanding. Will continue to monitor.

## 2024-08-14 ENCOUNTER — HOSPITAL ENCOUNTER (INPATIENT)
Age: 89
LOS: 2 days | Discharge: HOME | DRG: 644 | End: 2024-08-16
Attending: EMERGENCY MEDICINE | Admitting: FAMILY MEDICINE
Payer: MEDICARE

## 2024-08-14 ENCOUNTER — APPOINTMENT (OUTPATIENT)
Dept: GENERAL RADIOLOGY | Age: 89
DRG: 644 | End: 2024-08-14
Payer: MEDICARE

## 2024-08-14 ENCOUNTER — APPOINTMENT (OUTPATIENT)
Dept: CT IMAGING | Age: 89
DRG: 644 | End: 2024-08-14
Payer: MEDICARE

## 2024-08-14 DIAGNOSIS — R55 SYNCOPE, UNSPECIFIED SYNCOPE TYPE: ICD-10-CM

## 2024-08-14 DIAGNOSIS — R42 DIZZINESS: Primary | ICD-10-CM

## 2024-08-14 DIAGNOSIS — G45.9 TIA (TRANSIENT ISCHEMIC ATTACK): ICD-10-CM

## 2024-08-14 PROBLEM — R29.90 STROKE-LIKE SYMPTOMS: Status: ACTIVE | Noted: 2024-08-14

## 2024-08-14 LAB
ANION GAP SERPL CALCULATED.3IONS-SCNC: 11 MMOL/L (ref 3–16)
ANION GAP SERPL CALCULATED.3IONS-SCNC: 14 MMOL/L (ref 3–16)
APTT BLD: 25.2 SEC (ref 22.1–36.4)
BASOPHILS # BLD: 0 K/UL (ref 0–0.2)
BASOPHILS NFR BLD: 0.8 %
BILIRUB UR QL STRIP.AUTO: NEGATIVE
BUN SERPL-MCNC: 7 MG/DL (ref 7–20)
BUN SERPL-MCNC: 8 MG/DL (ref 7–20)
CALCIUM SERPL-MCNC: 8.8 MG/DL (ref 8.3–10.6)
CALCIUM SERPL-MCNC: 9.4 MG/DL (ref 8.3–10.6)
CHLORIDE SERPL-SCNC: 92 MMOL/L (ref 99–110)
CHLORIDE SERPL-SCNC: 92 MMOL/L (ref 99–110)
CLARITY UR: CLEAR
CLOSURE TME BLD-IMP: ABNORMAL
CLOSURE TME COLL+ADP BLD: 128 SEC (ref 56–110)
CLOSURE TME COLL+EPINEP BLD: >278 SEC (ref 86–194)
CO2 SERPL-SCNC: 23 MMOL/L (ref 21–32)
CO2 SERPL-SCNC: 26 MMOL/L (ref 21–32)
COLOR UR: YELLOW
CREAT SERPL-MCNC: <0.5 MG/DL (ref 0.6–1.2)
CREAT SERPL-MCNC: <0.5 MG/DL (ref 0.6–1.2)
DEPRECATED RDW RBC AUTO: 15.1 % (ref 12.4–15.4)
EKG ATRIAL RATE: 80 BPM
EKG DIAGNOSIS: NORMAL
EKG P AXIS: 32 DEGREES
EKG P-R INTERVAL: 160 MS
EKG Q-T INTERVAL: 366 MS
EKG QRS DURATION: 70 MS
EKG QTC CALCULATION (BAZETT): 422 MS
EKG R AXIS: 6 DEGREES
EKG T AXIS: 29 DEGREES
EKG VENTRICULAR RATE: 80 BPM
EOSINOPHIL # BLD: 0.1 K/UL (ref 0–0.6)
EOSINOPHIL NFR BLD: 1 %
FLUAV RNA RESP QL NAA+PROBE: NOT DETECTED
FLUBV RNA RESP QL NAA+PROBE: NOT DETECTED
GFR SERPLBLD CREATININE-BSD FMLA CKD-EPI: 87 ML/MIN/{1.73_M2}
GFR SERPLBLD CREATININE-BSD FMLA CKD-EPI: 87 ML/MIN/{1.73_M2}
GLUCOSE BLD-MCNC: 130 MG/DL (ref 70–99)
GLUCOSE SERPL-MCNC: 128 MG/DL (ref 70–99)
GLUCOSE SERPL-MCNC: 153 MG/DL (ref 70–99)
GLUCOSE UR STRIP.AUTO-MCNC: NEGATIVE MG/DL
HCT VFR BLD AUTO: 38.3 % (ref 36–48)
HGB BLD-MCNC: 12.5 G/DL (ref 12–16)
HGB UR QL STRIP.AUTO: ABNORMAL
INR PPP: 1.01 (ref 0.85–1.15)
KETONES UR STRIP.AUTO-MCNC: NEGATIVE MG/DL
LEUKOCYTE ESTERASE UR QL STRIP.AUTO: NEGATIVE
LYMPHOCYTES # BLD: 1.1 K/UL (ref 1–5.1)
LYMPHOCYTES NFR BLD: 18.4 %
MCH RBC QN AUTO: 27.9 PG (ref 26–34)
MCHC RBC AUTO-ENTMCNC: 32.7 G/DL (ref 31–36)
MCV RBC AUTO: 85.4 FL (ref 80–100)
MONOCYTES # BLD: 0.5 K/UL (ref 0–1.3)
MONOCYTES NFR BLD: 8.7 %
NEUTROPHILS # BLD: 4.1 K/UL (ref 1.7–7.7)
NEUTROPHILS NFR BLD: 71.1 %
NITRITE UR QL STRIP.AUTO: NEGATIVE
NT-PROBNP SERPL-MCNC: 379 PG/ML (ref 0–449)
PERFORMED ON: ABNORMAL
PH UR STRIP.AUTO: 6 [PH] (ref 5–8)
PLATELET # BLD AUTO: 318 K/UL (ref 135–450)
PMV BLD AUTO: 6.6 FL (ref 5–10.5)
POTASSIUM SERPL-SCNC: 3.5 MMOL/L (ref 3.5–5.1)
POTASSIUM SERPL-SCNC: 3.8 MMOL/L (ref 3.5–5.1)
PROT UR STRIP.AUTO-MCNC: NEGATIVE MG/DL
PROTHROMBIN TIME: 13.5 SEC (ref 11.9–14.9)
RBC # BLD AUTO: 4.48 M/UL (ref 4–5.2)
RBC #/AREA URNS HPF: NORMAL /HPF (ref 0–4)
SARS-COV-2 RNA RESP QL NAA+PROBE: NOT DETECTED
SODIUM SERPL-SCNC: 129 MMOL/L (ref 136–145)
SODIUM SERPL-SCNC: 129 MMOL/L (ref 136–145)
SP GR UR STRIP.AUTO: <=1.005 (ref 1–1.03)
TROPONIN, HIGH SENSITIVITY: 20 NG/L (ref 0–14)
TROPONIN, HIGH SENSITIVITY: 22 NG/L (ref 0–14)
TROPONIN, HIGH SENSITIVITY: 25 NG/L (ref 0–14)
UA COMPLETE W REFLEX CULTURE PNL UR: ABNORMAL
UA DIPSTICK W REFLEX MICRO PNL UR: YES
URN SPEC COLLECT METH UR: ABNORMAL
UROBILINOGEN UR STRIP-ACNC: 0.2 E.U./DL
WBC # BLD AUTO: 5.7 K/UL (ref 4–11)
WBC #/AREA URNS HPF: NORMAL /HPF (ref 0–5)

## 2024-08-14 PROCEDURE — 70450 CT HEAD/BRAIN W/O DYE: CPT

## 2024-08-14 PROCEDURE — 85576 BLOOD PLATELET AGGREGATION: CPT

## 2024-08-14 PROCEDURE — 36415 COLL VENOUS BLD VENIPUNCTURE: CPT

## 2024-08-14 PROCEDURE — 6360000004 HC RX CONTRAST MEDICATION: Performed by: PHYSICIAN ASSISTANT

## 2024-08-14 PROCEDURE — 2580000003 HC RX 258: Performed by: FAMILY MEDICINE

## 2024-08-14 PROCEDURE — 83880 ASSAY OF NATRIURETIC PEPTIDE: CPT

## 2024-08-14 PROCEDURE — 87636 SARSCOV2 & INF A&B AMP PRB: CPT

## 2024-08-14 PROCEDURE — 85610 PROTHROMBIN TIME: CPT

## 2024-08-14 PROCEDURE — 84484 ASSAY OF TROPONIN QUANT: CPT

## 2024-08-14 PROCEDURE — 85025 COMPLETE CBC W/AUTO DIFF WBC: CPT

## 2024-08-14 PROCEDURE — 85730 THROMBOPLASTIN TIME PARTIAL: CPT

## 2024-08-14 PROCEDURE — 71045 X-RAY EXAM CHEST 1 VIEW: CPT

## 2024-08-14 PROCEDURE — 84295 ASSAY OF SERUM SODIUM: CPT

## 2024-08-14 PROCEDURE — 99285 EMERGENCY DEPT VISIT HI MDM: CPT

## 2024-08-14 PROCEDURE — 70498 CT ANGIOGRAPHY NECK: CPT

## 2024-08-14 PROCEDURE — 1200000000 HC SEMI PRIVATE

## 2024-08-14 PROCEDURE — 93005 ELECTROCARDIOGRAM TRACING: CPT | Performed by: EMERGENCY MEDICINE

## 2024-08-14 PROCEDURE — 81001 URINALYSIS AUTO W/SCOPE: CPT

## 2024-08-14 PROCEDURE — 80048 BASIC METABOLIC PNL TOTAL CA: CPT

## 2024-08-14 RX ORDER — ENOXAPARIN SODIUM 100 MG/ML
40 INJECTION SUBCUTANEOUS DAILY
Status: DISCONTINUED | OUTPATIENT
Start: 2024-08-15 | End: 2024-08-16 | Stop reason: HOSPADM

## 2024-08-14 RX ORDER — ACETAMINOPHEN 325 MG/1
650 TABLET ORAL EVERY 6 HOURS PRN
Status: DISCONTINUED | OUTPATIENT
Start: 2024-08-14 | End: 2024-08-16 | Stop reason: HOSPADM

## 2024-08-14 RX ORDER — ASPIRIN 81 MG/1
81 TABLET, CHEWABLE ORAL DAILY
COMMUNITY

## 2024-08-14 RX ORDER — POLYETHYLENE GLYCOL 3350 17 G/17G
17 POWDER, FOR SOLUTION ORAL DAILY PRN
Status: DISCONTINUED | OUTPATIENT
Start: 2024-08-14 | End: 2024-08-16 | Stop reason: HOSPADM

## 2024-08-14 RX ORDER — POTASSIUM CHLORIDE 7.45 MG/ML
10 INJECTION INTRAVENOUS PRN
Status: DISCONTINUED | OUTPATIENT
Start: 2024-08-14 | End: 2024-08-16 | Stop reason: HOSPADM

## 2024-08-14 RX ORDER — IOPAMIDOL 755 MG/ML
75 INJECTION, SOLUTION INTRAVASCULAR
Status: COMPLETED | OUTPATIENT
Start: 2024-08-14 | End: 2024-08-14

## 2024-08-14 RX ORDER — ACETAMINOPHEN 650 MG/1
650 SUPPOSITORY RECTAL EVERY 6 HOURS PRN
Status: DISCONTINUED | OUTPATIENT
Start: 2024-08-14 | End: 2024-08-16 | Stop reason: HOSPADM

## 2024-08-14 RX ORDER — MAGNESIUM SULFATE IN WATER 40 MG/ML
2000 INJECTION, SOLUTION INTRAVENOUS PRN
Status: DISCONTINUED | OUTPATIENT
Start: 2024-08-14 | End: 2024-08-16 | Stop reason: HOSPADM

## 2024-08-14 RX ORDER — POTASSIUM CHLORIDE 1500 MG/1
40 TABLET, EXTENDED RELEASE ORAL PRN
Status: DISCONTINUED | OUTPATIENT
Start: 2024-08-14 | End: 2024-08-16 | Stop reason: HOSPADM

## 2024-08-14 RX ORDER — LABETALOL HYDROCHLORIDE 5 MG/ML
10 INJECTION, SOLUTION INTRAVENOUS EVERY 10 MIN PRN
Status: DISCONTINUED | OUTPATIENT
Start: 2024-08-14 | End: 2024-08-16 | Stop reason: HOSPADM

## 2024-08-14 RX ORDER — ACETAMINOPHEN 500 MG
1000 TABLET ORAL
Status: DISPENSED | OUTPATIENT
Start: 2024-08-14 | End: 2024-08-15

## 2024-08-14 RX ORDER — ASPIRIN 81 MG/1
81 TABLET, CHEWABLE ORAL DAILY
Status: DISCONTINUED | OUTPATIENT
Start: 2024-08-15 | End: 2024-08-16 | Stop reason: HOSPADM

## 2024-08-14 RX ORDER — SODIUM CHLORIDE 1 G/1
1 TABLET ORAL
Status: DISCONTINUED | OUTPATIENT
Start: 2024-08-14 | End: 2024-08-16

## 2024-08-14 RX ORDER — ASPIRIN 300 MG/1
300 SUPPOSITORY RECTAL DAILY
Status: DISCONTINUED | OUTPATIENT
Start: 2024-08-15 | End: 2024-08-16 | Stop reason: HOSPADM

## 2024-08-14 RX ORDER — SODIUM CHLORIDE 9 MG/ML
INJECTION, SOLUTION INTRAVENOUS CONTINUOUS
Status: DISCONTINUED | OUTPATIENT
Start: 2024-08-14 | End: 2024-08-14

## 2024-08-14 RX ORDER — ATORVASTATIN CALCIUM 40 MG/1
40 TABLET, FILM COATED ORAL DAILY
Status: DISCONTINUED | OUTPATIENT
Start: 2024-08-15 | End: 2024-08-16 | Stop reason: HOSPADM

## 2024-08-14 RX ORDER — SODIUM CHLORIDE 9 MG/ML
INJECTION, SOLUTION INTRAVENOUS ONCE
Status: COMPLETED | OUTPATIENT
Start: 2024-08-14 | End: 2024-08-14

## 2024-08-14 RX ORDER — LEVOTHYROXINE SODIUM 75 UG/1
75 TABLET ORAL DAILY
Status: DISCONTINUED | OUTPATIENT
Start: 2024-08-15 | End: 2024-08-16 | Stop reason: HOSPADM

## 2024-08-14 RX ADMIN — SODIUM CHLORIDE: 9 INJECTION, SOLUTION INTRAVENOUS at 16:27

## 2024-08-14 RX ADMIN — IOPAMIDOL 75 ML: 755 INJECTION, SOLUTION INTRAVENOUS at 11:57

## 2024-08-14 RX ADMIN — SODIUM CHLORIDE: 9 INJECTION, SOLUTION INTRAVENOUS at 20:06

## 2024-08-14 ASSESSMENT — ENCOUNTER SYMPTOMS
VOMITING: 0
NAUSEA: 0
BACK PAIN: 0
DIARRHEA: 0
CONSTIPATION: 0
SHORTNESS OF BREATH: 0
ABDOMINAL PAIN: 0

## 2024-08-14 ASSESSMENT — PAIN - FUNCTIONAL ASSESSMENT: PAIN_FUNCTIONAL_ASSESSMENT: NONE - DENIES PAIN

## 2024-08-14 NOTE — ED PROVIDER NOTES
0 - 4 /HPF   POCT Glucose   Result Value Ref Range    POC Glucose 130 (H) 70 - 99 mg/dl    Performed on ACCU-CHEK    EKG 12 Lead   Result Value Ref Range    Ventricular Rate 80 BPM    Atrial Rate 80 BPM    P-R Interval 160 ms    QRS Duration 70 ms    Q-T Interval 366 ms    QTc Calculation (Bazett) 422 ms    P Axis 32 degrees    R Axis 6 degrees    T Axis 29 degrees    Diagnosis       Normal sinus rhythmNonspecific T wave abnormalityAbnormal ECGEKG performed in ER and to be interpreted by ER physician.Confirmed by MD, ER ([id number]),  NATACHA VUONG ([id number]) on 8/14/2024 1:02:49 PM     EKG   Interpreted in conjunction with emergency department physician No att. providers found  Rhythm: normal sinus   Rate: normal  Axis: normal  Ectopy: none  Conduction: normal  ST Segments: nonspecific changes  T Waves: non specific changes      ED BEDSIDE ULTRASOUND:  No results found.    RECENT VITALS:  BP: (!) 172/70, Temp: 98 °F (36.7 °C), Pulse: 80, Respirations: 17, SpO2: 96 %     Procedures     None    ED Course     Nursing Notes, Past Medical Hx,Past Surgical Hx, Social Hx, Allergies, and Family Hx were reviewed.         The patient was given the following medications:  Orders Placed This Encounter   Medications    iopamidol (ISOVUE-370) 76 % injection 75 mL    acetaminophen (TYLENOL) tablet 1,000 mg    OR Linked Order Group     potassium chloride (KLOR-CON M) extended release tablet 40 mEq     potassium bicarb-citric acid (EFFER-K) effervescent tablet 40 mEq     potassium chloride 10 mEq/100 mL IVPB (Peripheral Line)    magnesium sulfate 2000 mg in 50 mL IVPB premix    enoxaparin (LOVENOX) injection 40 mg     Order Specific Question:   Indication of Use     Answer:   Prophylaxis-DVT/PE    polyethylene glycol (GLYCOLAX) packet 17 g    OR Linked Order Group     acetaminophen (TYLENOL) tablet 650 mg     acetaminophen (TYLENOL) suppository 650 mg    DISCONTD: 0.9 % sodium chloride infusion    OR Linked Order Group     aspirin  chewable tablet 81 mg     aspirin suppository 300 mg    labetalol (NORMODYNE;TRANDATE) injection 10 mg    0.9 % sodium chloride infusion    levothyroxine (SYNTHROID) tablet 75 mcg    atorvastatin (LIPITOR) tablet 40 mg    sodium chloride tablet 1 g       CONSULTS:  IP CONSULT TO STROKE TEAM  IP CONSULT TO NEUROSURGERY  IP CONSULT TO NEPHROLOGY  IP CONSULT TO GI    Review of Systems     Review of Systems   Constitutional:  Negative for chills and fever.   Respiratory:  Negative for shortness of breath.    Cardiovascular:  Negative for chest pain.   Gastrointestinal:  Negative for abdominal pain, constipation, diarrhea, nausea and vomiting.   Genitourinary:  Negative for dysuria and hematuria.   Musculoskeletal:  Negative for back pain.   Skin:  Negative for rash.   Neurological:  Positive for dizziness, weakness, light-headedness, numbness and headaches.       Past Medical, Surgical, Family, and Social History     She has a past medical history of Cerebral artery occlusion with cerebral infarction (HCC), Depression, Hemorrhagic stroke (HCC), Hyperlipidemia, Hypertension, and Thyroid disease.  She has a past surgical history that includes Cardiac surgery and brain surgery.  Her family history is not on file.  She reports that she has never smoked. She has never used smokeless tobacco. She reports that she does not drink alcohol.    Medications     Previous Medications    ASPIRIN 81 MG CHEWABLE TABLET    Take 1 tablet by mouth daily    ATENOLOL (TENORMIN) 50 MG TABLET    Take 1 tablet by mouth in the morning and at bedtime    ATORVASTATIN (LIPITOR) 40 MG TABLET    Take 1 tablet by mouth daily    CHOLECALCIFEROL (VITAMIN D3) 50 MCG (2000 UT) CAPS    Take by mouth    LEVOTHYROXINE (SYNTHROID) 75 MCG TABLET    Take 1 tablet by mouth Daily    LOSARTAN (COZAAR) 100 MG TABLET    Take 1 tablet by mouth daily    LUBIPROSTONE (AMITIZA) 24 MCG CAPSULE    Take 1 capsule by mouth 2 times daily (with meals)    PREGABALIN (LYRICA)  25 MG CAPSULE    Take 1 capsule by mouth 2 times daily.    SODIUM CHLORIDE 1 G TABLET    Take 1 tablet by mouth 3 times daily (with meals)       Allergies     She is allergic to ciprofibrate, ciprofloxacin, doxycycline calcium, nitrofurantoin, prednisone, rosuvastatin, and sulfa antibiotics.    Physical Exam     INITIAL VITALS: BP: 97/81, Temp: 98 °F (36.7 °C), Pulse: 81, Respirations: 19, SpO2: 94 %  Physical Exam  Constitutional:       General: She is not in acute distress.     Appearance: Normal appearance. She is normal weight. She is not toxic-appearing.   HENT:      Head: Normocephalic and atraumatic.      Nose: Nose normal.      Mouth/Throat:      Mouth: Mucous membranes are moist.      Comments: Very mild left-sided facial droop noted.  Eyes:      Pupils: Pupils are equal, round, and reactive to light.   Cardiovascular:      Rate and Rhythm: Normal rate.      Pulses: Normal pulses.   Pulmonary:      Effort: Pulmonary effort is normal. No respiratory distress.      Breath sounds: No wheezing or rales.   Chest:      Chest wall: No tenderness.   Abdominal:      General: Abdomen is flat.      Palpations: Abdomen is soft.      Tenderness: There is no abdominal tenderness.   Musculoskeletal:         General: Normal range of motion.      Cervical back: Normal range of motion. No rigidity or tenderness.      Right lower leg: No edema.      Left lower leg: No edema.   Skin:     General: Skin is warm and dry.   Neurological:      General: No focal deficit present.      Mental Status: She is alert.      Sensory: Sensory deficit present.      Motor: No weakness.      Comments: Patient endorses tingling in the left hand although able to differentiate sharp and dull throughout the hand.    Psychiatric:         Mood and Affect: Mood normal.           María Soliz PA-C  08/14/24 [id number]

## 2024-08-14 NOTE — ED NOTES
Pt states that when she swallows she sometimes \"has to swallow twice.\" MD notified       Carmeliat Lima, RN  08/14/24 0055

## 2024-08-14 NOTE — ED PROVIDER NOTES
ED Attending Attestation Note     Date of evaluation: 8/14/2024    This patient was seen by the advance practice provider.  I have seen and examined the patient, agree with the workup, evaluation, management and diagnosis. The care plan has been discussed.  I have reviewed the ECG and concur with the BRONSON's interpretation.      Briefly, Zully Escudero is a 89 y.o. female with a PMH inclusive of recent subdural hematoma and hyponatremia who presents for evaluation of sudden onset dizziness.  Patient also with left arm tingling    Notable exam findings include normal mentation    Assessment/ Medical Decision Making:     Initial exam concerning for subtle facial droop (no family present for collateral at that point) and left arm numbness. With this and report of sudden onset dizziness, patient evaluated as stroke alert. No TNK due to recent ICH. NO LVO noted on imaging. Symptoms improved repeat assessment. Admitted for TIA workup.          Richard Mei MD  08/16/24 [id number]

## 2024-08-14 NOTE — H&P
Hospital Medicine History & Physical      Date of Admission: 8/14/2024    Date of Service:  Pt seen/examined on 8/14/2024    [x]Admitted to Inpatient with expected LOS greater than two midnights due to medical therapy.  []Placed in Observation status.    Chief Admission Complaint: Lightheadedness/presyncope    Presenting Admission History:      89 y.o. female who presented to [facility] with lightheadedness/presyncope.  PMHx significant for recent fall with subdural hyponatremia chronic diarrhea hypertension hypothyroid.      Presents with dizziness.  States after eating breakfast and getting dressed she had a feeling of passing out, did not pass out, reports headache in the back of the head that is different.  Denies any numbness tingling weakness in the extremities.  Reports chronic diarrhea and some abdominal bloating this morning.  Reports some dysphagia especially when swallowing pills.  Denies chest pain chest pressure nausea or vomiting.    In the ER  Blood pressure elevated  Sodium 129  Glucose 128  Corrected sodium for glucose 130  Troponin 20 repeat 22    CT head shows No acute intracranial hemorrhage or mass effect. /Unchanged cerebral atrophy and chronic small vessel ischemic changes.    CTA head neck showed   No acute vascular abnormality. Specifically, no large vessel occlusion or flow-limiting stenosis. /Healing fracture of the superior endplate at T2.        Assessment/Plan:      Current Principal Problem:  Stroke-like symptoms    Strokelike symptoms/presyncope  -Rule out cardiac versus neurogenic causes  -MRI head  -Trend troponin  -Repeat EKG  -Statin  -Aspirin (per neurosurge note on 7/21/2024 hold anticoagulation for 2 weeks, outside of that window)  -Neurosurgery consult with worsening headache and recent history of subdural even though not on current imaging  -Permissive hypertension    Hyponatremia  -Corrected to 130  -Per nursing failed swallow screen unable to take p.o. at this time  -IV  articular surface. Sclerosis the acetabula articular surface. Findings are more prominent on the left with respect to the right No fracture dislocation Bilateral SI joint hypertrophic degenerative osteoarthritis Lower lumbar degenerative facet arthropathy and dextroscoliosis No osseous destructive lesions Soft tissues are normal     1. Degenerative hypertrophic osteoarthritis of the hips, left greater than right 2. SI joint arthropathy 3. Lower lumbar spondylosis and scoliosis 4. No significant interval changes Electronically signed by Will Colin MD    CT C-Spine W/O Contrast    Result Date: 7/21/2024  EXAM: CT CERVICAL SPINE WO CONTRAST INDICATION: fall and head strike. Pain COMPARISON: None. TECHNIQUE: Axial CT imaging obtained through the cervical spine. Axial images and multiplanar reformatted images reviewed. CT radiation dose optimization techniques (automated exposure control, use of a iterative reconstruction techniques, or adjustment of the mA or KV according to the patients size) were used to limit patient radiation dose IV contrast: None. FINDINGS: Cerebellar tonsils: Normal. Alignment: Levoscoliosis. Grade 1 anterolisthesis of C4-C5 and C7 on T1 Bones: No fracture or destructive osseous process. Neck soft tissues: Normal. Visualized lung apices and mediastinum: Normal. C2-C3: Normal disc annulus. No central canal or foraminal stenosis C3-C4: Mild bulging disc annulus. No significant central canal stenosis. Bilateral facet arthropathy. No significant foraminal stenosis. C4-C5: Degenerative anterolisthesis. Defect or whole bilateral foraminal stenosis, right greater than left. Patent central canal. Normal disc annulus. C5-C6: Posterior annular hyperostosis. No significant central canal stenosis. Foramina patent bilaterally C6-C7: Unremarkable C7-T1: Unremarkable     Degenerative changes, spondylosis. 2. No evidence of acute fracture or dislocation Levoscoliosis Electronically signed by Will  MD Dixie    CT Head W/O Contrast    Result Date: 7/21/2024  EXAM: COMPUTED TOMOGRAPHY OF THE BRAIN Noncontrast. INDICATIONS: Trauma, hematoma COMPARISON: None TECHNICAL FACTORS: Multiplanar contiguous spiral axial scanning Skull base to high convexity with multi-reformatted images. CT radiation dose optimization techniques (automated exposure control, use of a iterative reconstruction techniques, or adjustment of the mA or KV according to the patients size) were used to limit patient radiation dose.] FINDINGS: Noncontrast axial images reveal midline ventricles. Ventricular size and cortical sulci are  prominent compatible with age-related atrophic changes. Thickening of the right tentorium measuring 5.7 mm, coronal series 603 image 45, tentorial subdural hematoma. There is a small region of hypoattenuation, which may represent nonclotted blood on image 43, possible swirl sign. This is noted on sagittal image 17. Diffuse periventricular white matter hypodensity consistent with microangiopathic small vessel ischemic changes. No evidence of intra-axial hemorrhage. Posterior fossa is within normal limits Paranasal sinuses are unremarkable. Orbits: None Mastoids and Temporal Bones: None Skull base and Bony calvarium remains intact, no destructive lesions.     1. Right tentorial subdural hematoma measuring 5.3 mm in thickness. There is a central area of lucency on image 43. Whether this represents a small \"swirl sign\" is uncertain. Short-term interval follow-up imaging is recommended for surveillance. 2. Periventricular microangiopathic ischemic changes, chronic small vessel disease 3. Age-related atrophic changes Critical results reported to Physician: Jdoy Tobar at 8:12 AM on 7/21/2024. Electronically signed by Will Colin MD      PCP: No primary care provider on file.    Past Medical History:        Diagnosis Date    Cerebral artery occlusion with cerebral infarction (HCC)     Depression

## 2024-08-15 ENCOUNTER — APPOINTMENT (OUTPATIENT)
Dept: MRI IMAGING | Age: 89
DRG: 644 | End: 2024-08-15
Payer: MEDICARE

## 2024-08-15 PROBLEM — R42 DIZZINESS: Status: ACTIVE | Noted: 2024-08-15

## 2024-08-15 LAB
DEPRECATED RDW RBC AUTO: 14.9 % (ref 12.4–15.4)
EST. AVERAGE GLUCOSE BLD GHB EST-MCNC: 122.6 MG/DL
HBA1C MFR BLD: 5.9 %
HCT VFR BLD AUTO: 34.2 % (ref 36–48)
HGB BLD-MCNC: 11.3 G/DL (ref 12–16)
MCH RBC QN AUTO: 28.2 PG (ref 26–34)
MCHC RBC AUTO-ENTMCNC: 33.1 G/DL (ref 31–36)
MCV RBC AUTO: 85 FL (ref 80–100)
OSMOLALITY UR: 340 MOSM/KG (ref 390–1070)
PLATELET # BLD AUTO: 282 K/UL (ref 135–450)
PMV BLD AUTO: 6.7 FL (ref 5–10.5)
POTASSIUM UR-SCNC: 26.1 MMOL/L
RBC # BLD AUTO: 4.02 M/UL (ref 4–5.2)
SODIUM SERPL-SCNC: 128 MMOL/L (ref 136–145)
SODIUM SERPL-SCNC: 129 MMOL/L (ref 136–145)
SODIUM SERPL-SCNC: 130 MMOL/L (ref 136–145)
SODIUM SERPL-SCNC: 131 MMOL/L (ref 136–145)
SODIUM SERPL-SCNC: 132 MMOL/L (ref 136–145)
SODIUM UR-SCNC: 82 MMOL/L
WBC # BLD AUTO: 4.8 K/UL (ref 4–11)

## 2024-08-15 PROCEDURE — 92610 EVALUATE SWALLOWING FUNCTION: CPT

## 2024-08-15 PROCEDURE — 6370000000 HC RX 637 (ALT 250 FOR IP): Performed by: FAMILY MEDICINE

## 2024-08-15 PROCEDURE — 85027 COMPLETE CBC AUTOMATED: CPT

## 2024-08-15 PROCEDURE — 97530 THERAPEUTIC ACTIVITIES: CPT

## 2024-08-15 PROCEDURE — 70551 MRI BRAIN STEM W/O DYE: CPT

## 2024-08-15 PROCEDURE — 6360000002 HC RX W HCPCS: Performed by: FAMILY MEDICINE

## 2024-08-15 PROCEDURE — 6370000000 HC RX 637 (ALT 250 FOR IP): Performed by: INTERNAL MEDICINE

## 2024-08-15 PROCEDURE — 83036 HEMOGLOBIN GLYCOSYLATED A1C: CPT

## 2024-08-15 PROCEDURE — 97161 PT EVAL LOW COMPLEX 20 MIN: CPT

## 2024-08-15 PROCEDURE — 1200000000 HC SEMI PRIVATE

## 2024-08-15 PROCEDURE — 99222 1ST HOSP IP/OBS MODERATE 55: CPT | Performed by: INTERNAL MEDICINE

## 2024-08-15 PROCEDURE — 97116 GAIT TRAINING THERAPY: CPT

## 2024-08-15 PROCEDURE — 84133 ASSAY OF URINE POTASSIUM: CPT

## 2024-08-15 PROCEDURE — 83935 ASSAY OF URINE OSMOLALITY: CPT

## 2024-08-15 PROCEDURE — 80061 LIPID PANEL: CPT

## 2024-08-15 PROCEDURE — 97535 SELF CARE MNGMENT TRAINING: CPT

## 2024-08-15 PROCEDURE — 84300 ASSAY OF URINE SODIUM: CPT

## 2024-08-15 PROCEDURE — 97166 OT EVAL MOD COMPLEX 45 MIN: CPT

## 2024-08-15 PROCEDURE — 36415 COLL VENOUS BLD VENIPUNCTURE: CPT

## 2024-08-15 PROCEDURE — 80053 COMPREHEN METABOLIC PANEL: CPT

## 2024-08-15 PROCEDURE — 84295 ASSAY OF SERUM SODIUM: CPT

## 2024-08-15 RX ORDER — LOSARTAN POTASSIUM 25 MG/1
25 TABLET ORAL 2 TIMES DAILY
Status: DISCONTINUED | OUTPATIENT
Start: 2024-08-15 | End: 2024-08-16 | Stop reason: HOSPADM

## 2024-08-15 RX ADMIN — SODIUM CHLORIDE 1 G: 1 TABLET ORAL at 12:36

## 2024-08-15 RX ADMIN — SODIUM CHLORIDE 1 G: 1 TABLET ORAL at 09:57

## 2024-08-15 RX ADMIN — SODIUM CHLORIDE 1 G: 1 TABLET ORAL at 17:04

## 2024-08-15 RX ADMIN — ATORVASTATIN CALCIUM 40 MG: 40 TABLET, FILM COATED ORAL at 08:58

## 2024-08-15 RX ADMIN — ACETAMINOPHEN 650 MG: 325 TABLET ORAL at 04:06

## 2024-08-15 RX ADMIN — LOSARTAN POTASSIUM 25 MG: 25 TABLET, FILM COATED ORAL at 22:15

## 2024-08-15 RX ADMIN — ENOXAPARIN SODIUM 40 MG: 100 INJECTION SUBCUTANEOUS at 08:58

## 2024-08-15 RX ADMIN — LEVOTHYROXINE SODIUM 75 MCG: 0.07 TABLET ORAL at 06:46

## 2024-08-15 RX ADMIN — ASPIRIN 81 MG: 81 TABLET, CHEWABLE ORAL at 08:58

## 2024-08-15 RX ADMIN — ACETAMINOPHEN 650 MG: 325 TABLET ORAL at 09:56

## 2024-08-15 ASSESSMENT — PAIN DESCRIPTION - DESCRIPTORS
DESCRIPTORS: ACHING
DESCRIPTORS: ACHING

## 2024-08-15 ASSESSMENT — PAIN - FUNCTIONAL ASSESSMENT
PAIN_FUNCTIONAL_ASSESSMENT: ACTIVITIES ARE NOT PREVENTED
PAIN_FUNCTIONAL_ASSESSMENT: PREVENTS OR INTERFERES SOME ACTIVE ACTIVITIES AND ADLS

## 2024-08-15 ASSESSMENT — PAIN SCALES - GENERAL
PAINLEVEL_OUTOF10: 0
PAINLEVEL_OUTOF10: 3
PAINLEVEL_OUTOF10: 0
PAINLEVEL_OUTOF10: 2
PAINLEVEL_OUTOF10: 7
PAINLEVEL_OUTOF10: 0

## 2024-08-15 ASSESSMENT — PAIN DESCRIPTION - FREQUENCY: FREQUENCY: INTERMITTENT

## 2024-08-15 ASSESSMENT — PAIN DESCRIPTION - LOCATION
LOCATION: HEAD
LOCATION: HEAD

## 2024-08-15 ASSESSMENT — PAIN DESCRIPTION - DIRECTION: RADIATING_TOWARDS: NO

## 2024-08-15 ASSESSMENT — PAIN DESCRIPTION - PAIN TYPE: TYPE: ACUTE PAIN

## 2024-08-15 ASSESSMENT — PAIN DESCRIPTION - ORIENTATION
ORIENTATION: MID
ORIENTATION: ANTERIOR

## 2024-08-15 ASSESSMENT — PAIN DESCRIPTION - ONSET: ONSET: GRADUAL

## 2024-08-15 NOTE — PROGRESS NOTES
Occupational Therapy  Facility/Department: [facility] ORTHO/NEURO  Occupational Therapy Initial Assessment and Treatment    Name: Zully Escudero  : 1931  MRN: [id number]  Date of Service: 8/15/2024    Discharge Recommendations:  24 hour supervision or assist  OT Equipment Recommendations  Equipment Needed: No  Other: reacher (pt may have one or states she can purchase one)     Treatment Diagnosis: Impaired ADLs, mobility and activity tolerance    Assessment   Performance deficits / Impairments: Decreased functional mobility ;Decreased ADL status;Decreased strength;Decreased endurance;Decreased balance  Assessment: Pt from [place], has been staying with her daughter 7-8 weeks and receiving increased assist for ADLs and mobility since hospitalization 3 weeks ago. Pt presents slightly below her baseline, requiring CGA for transfers and ambulation with walker, min A for LB dressing and SBA-CGA for most other ADLs. Pt reports struggling with LB dressing at baseline, provided verbal education on AE for LB ADLs but would benefit from practicing with AE next session.  Recommend initial 24hr assist upon d/c- daughter plans to provide. Daughter does plan for pt d/c home to [place] in the next week. Pt has some caregiver assist at baseline but likely requires increased assist vs placement in LTC setting/assisted living. Daughter delines home or OP therapy needs at d/c.  Treatment Diagnosis: Impaired ADLs, mobility and activity tolerance  Prognosis: Good  Decision Making: Medium Complexity  REQUIRES OT FOLLOW-UP: Yes  Activity Tolerance  Activity Tolerance: Patient Tolerated treatment well        Plan   Occupational Therapy Plan  Times Per Week: 2-5  Current Treatment Recommendations: Safety education & training, Strengthening, Patient/Caregiver education & training, Functional mobility training, Endurance training, Self-Care / ADL     Restrictions  Restrictions/Precautions  Restrictions/Precautions: Fall Risk  drying)?: A Little  How much help is needed for toileting (which includes using toilet, bedpan, or urinal)?: A Little  How much help is needed for putting on and taking off regular upper body clothing?: None  How much help is needed for taking care of personal grooming?: A Little  How much help for eating meals?: None  AM-[facility] Inpatient Daily Activity Raw Score: 20  AM-PAC Inpatient ADL T-Scale Score : 42.03  ADL Inpatient CMS 0-100% Score: 38.32  ADL Inpatient CMS G-Code Modifier : CJ    Safety Devices  Type of Devices: Call light within reach;Chair alarm in place;Gait belt;Left in chair;Nurse notified    Goals  Short Term Goals  Time Frame for Short Term Goals: discharge  Short Term Goal 1: supervision toilet transfer- not met  Short Term Goal 2: supervision standing balance during grooming tasks- not met  Short Term Goal 3: supervision LB dressing using AE prn- not met  Short Term Goal 4: supervision bathroom mobility for ADLs using LRAD- not met       Therapy Time   Individual Concurrent Group Co-treatment   Time In 1000         Time Out 1040         Minutes 40         Timed Code Treatment Minutes: 25 Minutes   Total Treatment Time: 40 minutes    Andree Gama, OT

## 2024-08-15 NOTE — CARE COORDINATION
Case Management Assessment  Initial Evaluation    Date/Time of Evaluation: 8/15/2024 11:52 AM  Assessment Completed by: Tamiko Delgado RN    If patient is discharged prior to next notation, then this note serves as note for discharge by case management.    Patient Name: Zully Escudero                   YOB: 1931  Diagnosis: TIA (transient ischemic attack) [G45.9]  Dizziness [R42]  Stroke-like symptoms [R29.90]                   Date / Time: 8/14/2024 11:30 AM    Patient Admission Status: Inpatient   Readmission Risk (Low < 19, Mod (19-27), High > 27): Readmission Risk Score: 13.5    Current PCP: No primary care provider on file.  PCP verified by CM? Yes    Chart Reviewed: Yes      History Provided by: Child/Family, Patient  Patient Orientation: Alert and Oriented    Patient Cognition: Alert    Hospitalization in the last 30 days (Readmission):  Yes    If yes, Readmission Assessment in CM Navigator will be completed.    Advance Directives:      Code Status: Full Code   Patient's Primary Decision Maker is: Legal Next of Kin      Discharge Planning:    Patient lives with: Children Type of Home: House  Primary Care Giver: Private caregiver  Patient Support Systems include: Children   Current Financial resources:    Current community resources:    Current services prior to admission: None            Current DME:              Type of Home Care services:  None    ADLS  Prior functional level: Assistance with the following:, Bathing, Dressing, Toileting, Cooking, Housework, Shopping, Mobility  Current functional level: Assistance with the following:, Bathing, Toileting, Dressing, Cooking, Housework, Shopping, Mobility    PT AM-PAC: 18 /24  OT AM-PAC:   /24    Family can provide assistance at DC: Yes  Would you like Case Management to discuss the discharge plan with any other family members/significant others, and if so, who? Yes  Plans to Return to Present Housing: Yes  Other Identified  Agree with the Discharge Plan? Yes    Tamiko Delgado RN  Case Management Department  Ph:  Fax:

## 2024-08-15 NOTE — PROGRESS NOTES
89 y/o F , Hx of SDH   Admitted with dizziness and diarrhea   Na129   Given fluids   A/ hypovolemic Vs Siadh   Plan:   Recheck start Na   Na Q 4   Strict I/o   Urine lytes   Full consult in AM

## 2024-08-15 NOTE — CONSULTS
Consultation Note    Patient Name: Zully Escudero  : 1931  Age: 89 y.o.     Admitting Physician: Suyapa Guo MD   Date of Admission: 2024 11:30 AM   Primary Care Physician: No primary care provider on file.        Zully Escudero is being seen at the request of Suyapa Guo MD for chronic diarrhea.    History of Present Illness:  89-year-old female with history of recent fall with subdural hematoma, hypertension, hypothyroidism, chronic diarrhea was admitted with lightheadedness/presyncope.  We were consulted for chronic diarrhea.  As per her daughter, patient lives in [place] and follows with her primary gastroenterologist over there.  She has had several workup done for chronic diarrhea which has been going on for last several years now.  She describes more of having alternating constipation and diarrhea symptoms.  She had recently abdominal x-ray done by Dr. Barfield which showed findings of rectal stool burden.  Otherwise there is no complaints of any hematochezia or melena.  No abdominal pain.  She denies having any symptoms of dysphagia    Past Medical History:  Past Medical History:   Diagnosis Date    Cerebral artery occlusion with cerebral infarction (HCC)     Depression     Hemorrhagic stroke (HCC)     Hyperlipidemia     Hypertension     Thyroid disease         Past Surgical History:  Past Surgical History:   Procedure Laterality Date    BRAIN SURGERY       had drain placed after brain bleed was in ICU.    CARDIAC SURGERY      bypass in  in [facility].        Historical Medications:  Prior to Visit Medications    Medication Sig Taking? Authorizing Provider   aspirin 81 MG chewable tablet Take 1 tablet by mouth daily Yes Provider, MD Hugh   sodium chloride 1 g tablet Take 1 tablet by mouth 3 times daily (with meals) Yes Suyapa Guo MD   lubiprostone (AMITIZA) 24 MCG capsule Take 1 capsule by mouth 2 times daily (with meals) Yes Provider  MD Hugh   pregabalin (LYRICA) 25 MG capsule Take 1 capsule by mouth 2 times daily. Yes Provider, MD Hugh   atenolol (TENORMIN) 50 MG tablet Take 1 tablet by mouth in the morning and at bedtime Yes ProviderHugh MD   atorvastatin (LIPITOR) 40 MG tablet Take 1 tablet by mouth daily Yes Hugh Toussaint MD   levothyroxine (SYNTHROID) 75 MCG tablet Take 1 tablet by mouth Daily Yes Hugh Toussaint MD   losartan (COZAAR) 100 MG tablet Take 1 tablet by mouth daily Yes Provider, MD Hugh   Cholecalciferol (VITAMIN D3) 50 MCG (2000 UT) CAPS Take by mouth Yes Provider, MD Hugh        Hospital Medications:  Current Facility-Administered Medications: potassium chloride (KLOR-CON M) extended release tablet 40 mEq, 40 mEq, Oral, PRN **OR** potassium bicarb-citric acid (EFFER-K) effervescent tablet 40 mEq, 40 mEq, Oral, PRN **OR** potassium chloride 10 mEq/100 mL IVPB (Peripheral Line), 10 mEq, IntraVENous, PRN  magnesium sulfate 2000 mg in 50 mL IVPB premix, 2,000 mg, IntraVENous, PRN  enoxaparin (LOVENOX) injection 40 mg, 40 mg, SubCUTAneous, Daily  polyethylene glycol (GLYCOLAX) packet 17 g, 17 g, Oral, Daily PRN  acetaminophen (TYLENOL) tablet 650 mg, 650 mg, Oral, Q6H PRN **OR** acetaminophen (TYLENOL) suppository 650 mg, 650 mg, Rectal, Q6H PRN  aspirin chewable tablet 81 mg, 81 mg, Oral, Daily **OR** aspirin suppository 300 mg, 300 mg, Rectal, Daily  labetalol (NORMODYNE;TRANDATE) injection 10 mg, 10 mg, IntraVENous, Q10 Min PRN  levothyroxine (SYNTHROID) tablet 75 mcg, 75 mcg, Oral, Daily  atorvastatin (LIPITOR) tablet 40 mg, 40 mg, Oral, Daily  sodium chloride tablet 1 g, 1 g, Oral, TID WC     Social History:   Social History       Tobacco History       Smoking Status  Never      Smokeless Tobacco Use  Never              Alcohol History       Alcohol Use Status  Never              Drug Use       Drug Use Status  Not Asked              Sexual Activity       Sexually Active  Not  was admitted with lightheadedness/presyncope.    We were consulted for chronic diarrhea.  As per her daughter, patient lives in [place] and follows with her primary gastroenterologist over there.  She has had several workup done for chronic diarrhea which has been going on for last several years now.  She describes more of having alternating constipation and diarrhea symptoms.  She had recently abdominal x-ray done by Dr. Barfield which showed findings of rectal stool burden.  She denies having any dysphagia symptoms.    Her symptoms appear to be secondary to IBS with alternating constipation and diarrhea.  She has had extensive workup for her symptoms.  She probably requires further workup which can be done electively as outpatient.  Since she has a established primary gastroenterologist and upon discharge she plans to return back to [place], we will recommend her to follow-up with her primary GI.  Discussed with the daughter regarding low FODMAP diet, treating constipation with fiber supplements MiraLAX as needed.  Also checking stool for pancreatic elastase.  Again the workup can be done as outpatient    We will sign off.  Please call us back with any question or concerns        Rafael Guthrie MD    [facility]    [phone]. Also available via [employer]    Please note that some or all of this record was generated using voice recognition software. If there are any questions about the content of this document, please contact the author as some errors in translation may have occurred.

## 2024-08-15 NOTE — PLAN OF CARE
Problem: Discharge Planning  Goal: Discharge to home or other facility with appropriate resources  Outcome: Progressing  Flowsheets (Taken 8/14/2024 2000)  Discharge to home or other facility with appropriate resources:   Identify barriers to discharge with patient and caregiver   Arrange for needed discharge resources and transportation as appropriate   Identify discharge learning needs (meds, wound care, etc)     Problem: Safety - Adult  Goal: Free from fall injury  Outcome: Progressing  Note: All fall precautions in place, call light within reach, free from fall injury.      Problem: ABCDS Injury Assessment  Goal: Absence of physical injury  Outcome: Progressing

## 2024-08-15 NOTE — PROGRESS NOTES
V2.0    [facility] Progress Note      Name:  Zully Escudero /Age/Sex: 1931  (89 y.o. female)   MRN & CSN:  [id number] & [id number] Encounter Date/Time: 8/15/2024 8:10 AM EDT   Location:  [id number]- PCP: No primary care provider on file.     Attending:Suyapa Guo MD       Hospital Day: 2    Assessment and Recommendations   Zully Escudero is a 89 y.o. female with pmh of recent fall with sudden dural hematoma, hyponatremia, chronic diarrhea secondary to IBS, hypertension and hypothyroidism who presents with Stroke-like symptoms.  Patient is complaining of dizziness but no syncope      Plan:   Strokelike symptoms/presyncope  -Rule out cardiac versus neurogenic causes  -MRI head  -Trend troponin   EKG  -Statin  -Aspirin (per neurosurge note on 2024 hold anticoagulation for 2 weeks, outside of that window)  -Neurosurgery consult with worsening headache and recent history of subdural even though not on current imaging-discussed with neurosurgery on phone according to them no subdural noted will not need formal consult.  Continue care per neurology if needed please consult  -Permissive hypertension     Hyponatremia  -Corrected to 130 down to 129 again seen by nephrology team urine studies ordered urine osmole 340, every 6 hours sodium dropped to 128  -Per nursing failed swallow screen unable to take p.o. at this time  -IV fluids 500 cc normal saline x 1  -Nephrology consult  -Once able to tolerate p.o. restart home sodium     Dysphagia chronic diarrhea secondary to IBS  -GI consult        Discussed management of the case in detail w/ the Emergency Department Provider:       Diet ADULT DIET; Regular; 1200 ml   DVT Prophylaxis [] Lovenox, []  Heparin, [] SCDs, [] Ambulation,  [] Eliquis, [] Xarelto  [] Coumadin   Code Status Full Code   Disposition From: Home/ [place]  Expected Disposition: Home-visiting daughter in [place]  Estimated Date of Discharge: 1-2 days  Patient requires  the vertebral arteries. Lung apices demonstrate respiratory motion artifact. There is a healing fracture noted at T2. There is mild superior endplate loss of height. Multilevel degenerative changes are noted. There is grade 1 anterolisthesis of C4 on C5. CTA HEAD: The high cervical, petrous, cavernous, and supraclinoid internal carotid arteries have a normal course and caliber. An anterior communicating artery is visualized. The proximal CHERRY and MCA branches are patent. There is a right posterior communicating artery and a left posterior communicating artery. The basilar artery has a normal caliber. Proximal PCA branches are patent. SCA origins are visualized. The origins of the PICAs are visualized. There is no intracranial high-grade stenosis. No aneurysm or arteriovenous malformation.     1.  No acute vascular abnormality. Specifically, no large vessel occlusion or flow-limiting stenosis. 2.  Healing fracture of the superior endplate at T2. Electronically signed by Ramakrishna Oropeza    CT HEAD WO CONTRAST    Result Date: 8/14/2024  EXAM: CT CODE NEURO HEAD WO CONTRAST INDICATION: left hand numbness and left facial droop; COMPARISON: Head CT 7/22/2024 TECHNICAL: Axial CT imaging obtained from vertex to skull base. Axial images and multiplanar reformatted images reviewed.  Individualized dose optimization technique was used in order to meet ALARA standards for radiation dose reduction.  In addition to vendor specific dose reduction algorithms, the dose reduction techniques vary based on the specific scanner utilized but frequently include automated exposure control, adjustment of the mA and/or kV according to patient size, and use of iterative reconstruction technique. CONTRAST: None. FINDINGS: No acute intracranial hemorrhage, mass effect, or extra-axial collection. Resolution of prior right subdural hematoma along the tentorial leaflet. Unchanged ventricular enlargement and sulcal prominence. Confluent  periventricular white matter hypoattenuation. Gray-white differentiation intact. No acute calvarial abnormality. No orbital abnormality. Paranasal sinuses and mastoid air cells clear.     No acute intracranial hemorrhage or mass effect.  Unchanged cerebral atrophy and chronic small vessel ischemic changes. Critical results reported to María Soliz PA-C  at 12:03 on 8/14/2024. Electronically signed by Carroll Ruiz      CBC:   Recent Labs     08/14/24  1154 08/15/24  0533   WBC 5.7 4.8   HGB 12.5 11.3*    282     BMP:    Recent Labs     08/14/24  1154 08/14/24  2217 08/14/24  2355 08/15/24  0533   * 129* 130* 131*   K 3.8 3.5  --   --    CL 92* 92*  --   --    CO2 26 23  --   --    BUN 8 7  --   --    CREATININE <0.5* <0.5*  --   --    GLUCOSE 128* 153*  --   --      Hepatic: No results for input(s): \"AST\", \"ALT\", \"BILITOT\", \"ALKPHOS\" in the last 72 hours.    Invalid input(s): \"ALB\"  Lipids: No results found for: \"CHOL\", \"HDL\", \"TRIG\"  Hemoglobin A1C: No results found for: \"LABA1C\"  TSH:   Lab Results   Component Value Date/Time    TSH 14.84 07/21/2024 08:12 AM     Troponin: No results found for: \"TROPONINT\"  Lactic Acid: No results for input(s): \"LACTA\" in the last 72 hours.  BNP:   Recent Labs     08/14/24  1154   PROBNP 379     UA:  Lab Results   Component Value Date/Time    NITRU Negative 08/14/2024 02:10 PM    COLORU Yellow 08/14/2024 02:10 PM    PHUR 6.0 08/14/2024 02:10 PM    WBCUA None seen 08/14/2024 02:10 PM    RBCUA None seen 08/14/2024 02:10 PM    CLARITYU Clear 08/14/2024 02:10 PM    LEUKOCYTESUR Negative 08/14/2024 02:10 PM    UROBILINOGEN 0.2 08/14/2024 02:10 PM    BILIRUBINUR Negative 08/14/2024 02:10 PM    BLOODU TRACE-INTACT 08/14/2024 02:10 PM    GLUCOSEU Negative 08/14/2024 02:10 PM    KETUA Negative 08/14/2024 02:10 PM     Urine Cultures:   Lab Results   Component Value Date/Time    LABURIN No growth at 18 to 36 hours 07/21/2024 03:26 PM     Blood Cultures: No results found for:

## 2024-08-15 NOTE — PROGRESS NOTES
Speech Language Pathology  Facility/Department:[facility] 5T ORTHO/NEURO  Bedside Swallowing Evaluation  discharge  Name: Zully Escudero  : 1931  MRN: [id number]                                                       Patient Diagnosis(es):   Patient Active Problem List    Diagnosis Date Noted    Stroke-like symptoms 2024    Subdural hematoma (HCC) 2024    Hyponatremia 2024    Primary hypertension 2024    Hypothyroidism 2024       Past Medical History:   Diagnosis Date    Cerebral artery occlusion with cerebral infarction (HCC)     Depression     Hemorrhagic stroke (HCC)     Hyperlipidemia     Hypertension     Thyroid disease      Past Surgical History:   Procedure Laterality Date    BRAIN SURGERY       had drain placed after brain bleed was in ICU.    CARDIAC SURGERY      bypass in  in [facility].       Reason for Referral:  Zully Escudero  was referred for a Speech Therapy evaluation to assess swallow function and/or communication.    History of Present Illness  Per MD notes:  Zully Escudero is a 89 y.o. female who presents to the emergency department with a past medical history of recent subdural hematoma 3 weeks ago, chronic hyponatremia, hypertension, hypothyroidism who is presenting today with a chief complaint of dizziness.  The patient states that she remembers eating breakfast and then went to lay down within the past 1 hour around 11:00 and started feeling dizzy and could not get up.  Reportedly her daughter tried to get her up and was not able to do so so called the squad.  The patient reports feeling lightheaded but also feeling like the room was spinning.  She denies any dizziness currently.  She states that she has had some tingling in her left hand that she was not having previously.  Denies any extremity weakness.  She states she has a headache in her posterior head. There was talk with the patient's nephrologist recently  about decreasing her blood pressure medications due to some possible low blood pressures.  Patient denies any abdominal pain, nausea/vomiting/diarrhea.  She denies any chest pain or shortness of breath.  She states she has been taking all her medications as prescribed.  She is not on any anticoagulation.  She did not fall today.     Imaging  XR CHEST PORTABLE   Final Result      Clear lungs.      Normal cardiomediastinal silhouette status post sternotomy.      Electronically signed by Jose Enrique Flannery      CT HEAD WO CONTRAST   Final Result      No acute intracranial hemorrhage or mass effect.        Unchanged cerebral atrophy and chronic small vessel ischemic changes.      Critical results reported to María Soliz PA-C  at 12:03 on 8/14/2024.      Electronically signed by Carroll Ruiz      CTA HEAD NECK W CONTRAST   Final Result      1.  No acute vascular abnormality. Specifically, no large vessel occlusion or   flow-limiting stenosis.    2.  Healing fracture of the superior endplate at T2.       Electronically signed by Ramakrishna Oropeza      MRI brain without contrast    (Results Pending)       Date of onset: 8/14/24    Current Diet:  ADULT DIET; Regular; 1200 ml      Treatment Diagnosis: r/o dysphagia     Pain:  Denied pain     General:  Chart reviewed: Yes  Behavior/Cognition: Ox3, alert, pleasant and cooperative  Communication Observation: WFL  Follows Directions: WFL  Dentition/Oral Mucosa: WFL  Oral motor exam: WFL  Vocal Quality: slightly gravely- daughter reports this is her baseline  Respiratory Status/oxygen requirements: room air  Vision/Hearing: wears glasses at all times, hearing functional   Patient Complaint: pt without complaints  Patient Positioning: upright in bed   Prior Level of Function: resides at home with home care aids    Prior Dysphagia History:   No history of dysphagia per pt and daughter     Bedside Swallowing Evaluation Impression 8/15/24  ROM of oral structures was WFL.  Pt able to

## 2024-08-15 NOTE — PROGRESS NOTES
Pt Aox4 and able to make needs known. X1 assist w/ rolling walker to bathroom. Off unit for part of shift for MRI, currently resting in bed. No acute events and remains free of injury. Call light and personal items kept w/in reach.

## 2024-08-15 NOTE — PLAN OF CARE
Problem: Discharge Planning  Goal: Discharge to home or other facility with appropriate resources  8/15/2024 1045 by Manuelito Vásquez, RN  Outcome: Progressing  Flowsheets (Taken 8/15/2024 0800)  Discharge to home or other facility with appropriate resources:   Identify barriers to discharge with patient and caregiver   Arrange for needed discharge resources and transportation as appropriate   Identify discharge learning needs (meds, wound care, etc)   Arrange for interpreters to assist at discharge as needed   Refer to discharge planning if patient needs post-hospital services based on physician order or complex needs related to functional status, cognitive ability or social support system  8/14/2024 2359 by Suzie Stahl, RN  Outcome: Progressing  Flowsheets (Taken 8/14/2024 2000)  Discharge to home or other facility with appropriate resources:   Identify barriers to discharge with patient and caregiver   Arrange for needed discharge resources and transportation as appropriate   Identify discharge learning needs (meds, wound care, etc)

## 2024-08-15 NOTE — PROGRESS NOTES
Pt admitted form ED. VS taken and recorded. SPO2 maintained at RA. 4 eyes, admission and head to toe assessment done. Pt has passed swallow screening test. Pt denies pain. Medication given per MAR. Pt is voiding via pure wick. No BM this shift. All fall precautions in place, call light within reach, free from fall injury. Plan of care ongoing.

## 2024-08-15 NOTE — PROGRESS NOTES
4 Eyes Skin Assessment     NAME:  Zully Escudero  YOB: 1931  MEDICAL RECORD NUMBER:  [id number]    The patient is being assessed for  Admission    I agree that at least one RN has performed a thorough Head to Toe Skin Assessment on the patient. ALL assessment sites listed below have been assessed.      Areas assessed by both nurses:    Head, Face, Ears, Shoulders, Back, Chest, Arms, Elbows, Hands, Sacrum. Buttock, Coccyx, Ischium, Legs. Feet and Heels, Under Medical Devices , and Other na   Blanchable redness on the both heels, sacrum and coccyx.   Vascular discoloration and dry skin on L leg and both forearm       Does the Patient have a Wound? No noted wound(s)       Garcia Prevention initiated by RN: No  Wound Care Orders initiated by RN: No    Pressure Injury (Stage 3,4, Unstageable, DTI, NWPT, and Complex wounds) if present, place Wound referral order by RN under : No    New Ostomies, if present place, Ostomy referral order under : No     Nurse 1 eSignature: Electronically signed by Suzie Stahl RN on 8/14/24 at 8:35 PM EDT    **SHARE this note so that the co-signing nurse can place an eSignature**    Nurse 2 eSignature: Electronically signed by Leticia Wilkes RN on 8/14/24 at 10:31 PM EDT

## 2024-08-15 NOTE — PROGRESS NOTES
Physical Therapy  Facility/Department: [facility] 5T ORTHO/NEURO  Physical Therapy Initial Assessment    Name: Zully Escudero  : 1931  MRN: [id number]  Date of Service: 8/15/2024    Discharge Recommendations:  24 hour supervision or assist, No therapy recommended at discharge (home with daughter)   PT Equipment Recommendations  Equipment Needed: No  Other: owns rollator      Patient Diagnosis(es): The primary encounter diagnosis was Dizziness. Diagnoses of TIA (transient ischemic attack) and Syncope, unspecified syncope type were also pertinent to this visit.  Past Medical History:  has a past medical history of Cerebral artery occlusion with cerebral infarction (HCC), Depression, Hemorrhagic stroke (HCC), Hyperlipidemia, Hypertension, and Thyroid disease.  Past Surgical History:  has a past surgical history that includes Cardiac surgery and brain surgery.    Assessment   Body Structures, Functions, Activity Limitations Requiring Skilled Therapeutic Intervention: Decreased functional mobility ;Decreased strength;Decreased endurance;Decreased balance  Assessment: Patient demonstrates impaired functional mobility, now requiring SBA to [facility] with RW for standing mobility, including gait with RW up to 20ft.  Patient limited by low functional strength and endurance.  Patient temporarily living with daughter, planning to return to [place] in the next week with intermittent (A) from caregivers.  Patient has initial 24 hr (A) from daughter upon d/c.  Patient and daughter deny need for home PT, daughter assists and encourages mobility in home setting.  Patient will continue to benefit from additional skilled PT intervention to facilitate safe mobility and to optimize (I) to promote return to prior level of function.  Treatment Diagnosis: impaired functional mobility  Therapy Prognosis: Good  Decision Making: Low Complexity  Barriers to Learning: none  Requires PT Follow-Up: Yes  Activity Tolerance  Activity  Tolerance: Patient tolerated evaluation without incident     Plan   Physical Therapy Plan  General Plan:  (2-5x/week)  Current Treatment Recommendations: Strengthening, ROM, Balance training, Functional mobility training, Transfer training, Endurance training, Gait training, Stair training, Neuromuscular re-education, Home exercise program, Safety education & training, Patient/Caregiver education & training, Equipment evaluation, education, & procurement, Therapeutic activities  Safety Devices  Type of Devices: Call light within reach, Chair alarm in place, Gait belt, Left in chair, Nurse notified     Restrictions  Restrictions/Precautions  Restrictions/Precautions: Fall Risk (high fall risk)  Position Activity Restriction  Other position/activity restrictions: elevate head of bed 30 degrees, up with assistance, 1200ml daily fluid restriction     Subjective   General  Chart Reviewed: Yes  Additional Pertinent Hx: ED 8/14 related to dizziness; CT head shows No acute intracranial hemorrhage or mass effect. /Unchanged cerebral atrophy and chronic small vessel ischemic changes.     CTA head neck showed   No acute vascular abnormality. Specifically, no large vessel occlusion or flow-limiting stenosis. /Healing fracture of the superior endplate at T2.  PMH: recent SDH 3 weeks ago, chronic hyponatremia, HTN, hypothyroidism  Family / Caregiver Present: Yes (daughter)  Referring Practitioner: MD Lui  Referral Date : 08/14/24  Diagnosis: TIA  Follows Commands: Within Functional Limits  General Comment  Comments: Patient semifowlers in bed upon arrival - agreeable to PT.  Subjective  Subjective: Patient denies pain, denies dizziness with mobility, slight lightheadedness with initial OOB mobility.         Social/Functional History  Social/Functional History  Lives With: Daughter  Type of Home: House  Home Layout: One level  Home Access: Stairs to enter without rails  Entrance Stairs - Number of Steps: 1 + 1  Bathroom

## 2024-08-16 ENCOUNTER — APPOINTMENT (OUTPATIENT)
Age: 89
DRG: 644 | End: 2024-08-16
Payer: MEDICARE

## 2024-08-16 VITALS
DIASTOLIC BLOOD PRESSURE: 69 MMHG | TEMPERATURE: 97.4 F | SYSTOLIC BLOOD PRESSURE: 166 MMHG | RESPIRATION RATE: 16 BRPM | HEART RATE: 87 BPM | OXYGEN SATURATION: 93 % | HEIGHT: 57 IN | BODY MASS INDEX: 26.32 KG/M2 | WEIGHT: 122 LBS

## 2024-08-16 LAB
ALBUMIN SERPL-MCNC: 3.8 G/DL (ref 3.4–5)
ALBUMIN SERPL-MCNC: 3.8 G/DL (ref 3.4–5)
ALBUMIN/GLOB SERPL: 1.5 {RATIO} (ref 1.1–2.2)
ALBUMIN/GLOB SERPL: 1.5 {RATIO} (ref 1.1–2.2)
ALP SERPL-CCNC: 68 U/L (ref 40–129)
ALP SERPL-CCNC: 69 U/L (ref 40–129)
ALT SERPL-CCNC: 21 U/L (ref 10–40)
ALT SERPL-CCNC: 21 U/L (ref 10–40)
ANION GAP SERPL CALCULATED.3IONS-SCNC: 12 MMOL/L (ref 3–16)
ANION GAP SERPL CALCULATED.3IONS-SCNC: 15 MMOL/L (ref 3–16)
AST SERPL-CCNC: 22 U/L (ref 15–37)
AST SERPL-CCNC: 24 U/L (ref 15–37)
BILIRUB SERPL-MCNC: 0.3 MG/DL (ref 0–1)
BILIRUB SERPL-MCNC: <0.2 MG/DL (ref 0–1)
BUN SERPL-MCNC: 7 MG/DL (ref 7–20)
BUN SERPL-MCNC: 7 MG/DL (ref 7–20)
CALCIUM SERPL-MCNC: 8.7 MG/DL (ref 8.3–10.6)
CALCIUM SERPL-MCNC: 8.7 MG/DL (ref 8.3–10.6)
CHLORIDE SERPL-SCNC: 93 MMOL/L (ref 99–110)
CHLORIDE SERPL-SCNC: 96 MMOL/L (ref 99–110)
CHOLEST SERPL-MCNC: 131 MG/DL (ref 0–199)
CO2 SERPL-SCNC: 22 MMOL/L (ref 21–32)
CO2 SERPL-SCNC: 24 MMOL/L (ref 21–32)
CREAT SERPL-MCNC: <0.5 MG/DL (ref 0.6–1.2)
CREAT SERPL-MCNC: <0.5 MG/DL (ref 0.6–1.2)
DEPRECATED RDW RBC AUTO: 14.7 % (ref 12.4–15.4)
ECHO AO ASC DIAM: 3 CM
ECHO AO ASCENDING AORTA INDEX: 2.05 CM/M2
ECHO AO ROOT DIAM: 3 CM
ECHO AO ROOT INDEX: 2.05 CM/M2
ECHO AR MAX VEL PISA: 4.1 M/S
ECHO AV AREA PEAK VELOCITY: 1.7 CM2
ECHO AV AREA VTI: 1.8 CM2
ECHO AV AREA/BSA PEAK VELOCITY: 1.2 CM2/M2
ECHO AV AREA/BSA VTI: 1.2 CM2/M2
ECHO AV MEAN GRADIENT: 4 MMHG
ECHO AV MEAN VELOCITY: 0.9 M/S
ECHO AV PEAK GRADIENT: 6 MMHG
ECHO AV PEAK VELOCITY: 1.3 M/S
ECHO AV REGURGITANT PHT: 530 MS
ECHO AV VELOCITY RATIO: 0.85
ECHO AV VTI: 22.3 CM
ECHO BSA: 1.49 M2
ECHO IVC PROX: 1.6 CM
ECHO LA AREA 2C: 18.9 CM2
ECHO LA AREA 4C: 19.3 CM2
ECHO LA DIAMETER INDEX: 2.33 CM/M2
ECHO LA DIAMETER: 3.4 CM
ECHO LA MAJOR AXIS: 5 CM
ECHO LA MINOR AXIS: 4.8 CM
ECHO LA TO AORTIC ROOT RATIO: 1.13
ECHO LA VOL BP: 57 ML (ref 22–52)
ECHO LA VOL MOD A2C: 58 ML (ref 22–52)
ECHO LA VOL MOD A4C: 56 ML (ref 22–52)
ECHO LA VOL/BSA BIPLANE: 39 ML/M2 (ref 16–34)
ECHO LA VOLUME INDEX MOD A2C: 40 ML/M2 (ref 16–34)
ECHO LA VOLUME INDEX MOD A4C: 38 ML/M2 (ref 16–34)
ECHO LV E' LATERAL VELOCITY: 6 CM/S
ECHO LV E' SEPTAL VELOCITY: 6 CM/S
ECHO LV EDV A2C: 51 ML
ECHO LV EDV A4C: 63 ML
ECHO LV EDV INDEX A4C: 43 ML/M2
ECHO LV EDV NDEX A2C: 35 ML/M2
ECHO LV EJECTION FRACTION A2C: 67 %
ECHO LV EJECTION FRACTION A4C: 55 %
ECHO LV EJECTION FRACTION BIPLANE: 59 % (ref 55–100)
ECHO LV ESV A2C: 17 ML
ECHO LV ESV A4C: 28 ML
ECHO LV ESV INDEX A2C: 12 ML/M2
ECHO LV ESV INDEX A4C: 19 ML/M2
ECHO LV FRACTIONAL SHORTENING: 38 % (ref 28–44)
ECHO LV INTERNAL DIMENSION DIASTOLE INDEX: 2.74 CM/M2
ECHO LV INTERNAL DIMENSION DIASTOLIC: 4 CM (ref 3.9–5.3)
ECHO LV INTERNAL DIMENSION SYSTOLIC INDEX: 1.71 CM/M2
ECHO LV INTERNAL DIMENSION SYSTOLIC: 2.5 CM
ECHO LV IVSD: 1.1 CM (ref 0.6–0.9)
ECHO LV MASS 2D: 145.6 G (ref 67–162)
ECHO LV MASS INDEX 2D: 99.8 G/M2 (ref 43–95)
ECHO LV POSTERIOR WALL DIASTOLIC: 1.1 CM (ref 0.6–0.9)
ECHO LV RELATIVE WALL THICKNESS RATIO: 0.55
ECHO LVOT AREA: 2 CM2
ECHO LVOT AV VTI INDEX: 0.91
ECHO LVOT DIAM: 1.6 CM
ECHO LVOT MEAN GRADIENT: 3 MMHG
ECHO LVOT PEAK GRADIENT: 5 MMHG
ECHO LVOT PEAK VELOCITY: 1.1 M/S
ECHO LVOT STROKE VOLUME INDEX: 28.1 ML/M2
ECHO LVOT SV: 41 ML
ECHO LVOT VTI: 20.4 CM
ECHO MV A VELOCITY: 0.99 M/S
ECHO MV E VELOCITY: 0.63 M/S
ECHO MV E/A RATIO: 0.64
ECHO MV E/E' LATERAL: 10.5
ECHO MV E/E' RATIO (AVERAGED): 10.5
ECHO MV E/E' SEPTAL: 10.5
ECHO PULMONARY ARTERY SYSTOLIC PRESSURE (PASP): 20 MMHG
ECHO PV MAX VELOCITY: 1.2 M/S
ECHO PV MEAN GRADIENT: 3 MMHG
ECHO PV MEAN VELOCITY: 0.8 M/S
ECHO PV PEAK GRADIENT: 5 MMHG
ECHO PV VTI: 21 CM
ECHO RA AREA 4C: 12.2 CM2
ECHO RA END SYSTOLIC VOLUME APICAL 4 CHAMBER INDEX BSA: 18 ML/M2
ECHO RA VOLUME: 27 ML
ECHO RV BASAL DIMENSION: 2.7 CM
ECHO RV FREE WALL PEAK S': 11 CM/S
ECHO RV LONGITUDINAL DIMENSION: 5.5 CM
ECHO RV MID DIMENSION: 2.2 CM
ECHO RV TAPSE: 1.8 CM (ref 1.7–?)
ECHO TV REGURGITANT MAX VELOCITY: 2.09 M/S
ECHO TV REGURGITANT PEAK GRADIENT: 17 MMHG
GFR SERPLBLD CREATININE-BSD FMLA CKD-EPI: 87 ML/MIN/{1.73_M2}
GFR SERPLBLD CREATININE-BSD FMLA CKD-EPI: 87 ML/MIN/{1.73_M2}
GLUCOSE SERPL-MCNC: 102 MG/DL (ref 70–99)
GLUCOSE SERPL-MCNC: 107 MG/DL (ref 70–99)
HCT VFR BLD AUTO: 33.8 % (ref 36–48)
HDLC SERPL-MCNC: 52 MG/DL (ref 40–60)
HGB BLD-MCNC: 11.2 G/DL (ref 12–16)
LDLC SERPL CALC-MCNC: 57 MG/DL
MCH RBC QN AUTO: 28 PG (ref 26–34)
MCHC RBC AUTO-ENTMCNC: 32.9 G/DL (ref 31–36)
MCV RBC AUTO: 85 FL (ref 80–100)
PLATELET # BLD AUTO: 292 K/UL (ref 135–450)
PMV BLD AUTO: 6.6 FL (ref 5–10.5)
POTASSIUM SERPL-SCNC: 3.9 MMOL/L (ref 3.5–5.1)
POTASSIUM SERPL-SCNC: 3.9 MMOL/L (ref 3.5–5.1)
PROT SERPL-MCNC: 6.3 G/DL (ref 6.4–8.2)
PROT SERPL-MCNC: 6.3 G/DL (ref 6.4–8.2)
RBC # BLD AUTO: 3.98 M/UL (ref 4–5.2)
SODIUM SERPL-SCNC: 130 MMOL/L (ref 136–145)
SODIUM SERPL-SCNC: 130 MMOL/L (ref 136–145)
SODIUM SERPL-SCNC: 132 MMOL/L (ref 136–145)
TRIGL SERPL-MCNC: 112 MG/DL (ref 0–150)
VLDLC SERPL CALC-MCNC: 22 MG/DL
WBC # BLD AUTO: 5.2 K/UL (ref 4–11)

## 2024-08-16 PROCEDURE — 97530 THERAPEUTIC ACTIVITIES: CPT

## 2024-08-16 PROCEDURE — 97535 SELF CARE MNGMENT TRAINING: CPT

## 2024-08-16 PROCEDURE — 99232 SBSQ HOSP IP/OBS MODERATE 35: CPT | Performed by: HOSPITALIST

## 2024-08-16 PROCEDURE — 6370000000 HC RX 637 (ALT 250 FOR IP): Performed by: FAMILY MEDICINE

## 2024-08-16 PROCEDURE — 93306 TTE W/DOPPLER COMPLETE: CPT

## 2024-08-16 PROCEDURE — 85027 COMPLETE CBC AUTOMATED: CPT

## 2024-08-16 PROCEDURE — 6360000002 HC RX W HCPCS: Performed by: FAMILY MEDICINE

## 2024-08-16 PROCEDURE — 84295 ASSAY OF SERUM SODIUM: CPT

## 2024-08-16 PROCEDURE — 93306 TTE W/DOPPLER COMPLETE: CPT | Performed by: INTERNAL MEDICINE

## 2024-08-16 PROCEDURE — 80053 COMPREHEN METABOLIC PANEL: CPT

## 2024-08-16 PROCEDURE — 36415 COLL VENOUS BLD VENIPUNCTURE: CPT

## 2024-08-16 PROCEDURE — 6370000000 HC RX 637 (ALT 250 FOR IP): Performed by: INTERNAL MEDICINE

## 2024-08-16 PROCEDURE — 93356 MYOCRD STRAIN IMG SPCKL TRCK: CPT | Performed by: INTERNAL MEDICINE

## 2024-08-16 RX ORDER — LOSARTAN POTASSIUM 25 MG/1
25 TABLET ORAL 2 TIMES DAILY
Qty: 30 TABLET | Refills: 3 | Status: SHIPPED | OUTPATIENT
Start: 2024-08-16

## 2024-08-16 RX ADMIN — ENOXAPARIN SODIUM 40 MG: 100 INJECTION SUBCUTANEOUS at 09:05

## 2024-08-16 RX ADMIN — SODIUM CHLORIDE 1 G: 1 TABLET ORAL at 09:04

## 2024-08-16 RX ADMIN — ATORVASTATIN CALCIUM 40 MG: 40 TABLET, FILM COATED ORAL at 09:04

## 2024-08-16 RX ADMIN — ASPIRIN 81 MG: 81 TABLET, CHEWABLE ORAL at 09:04

## 2024-08-16 RX ADMIN — LEVOTHYROXINE SODIUM 75 MCG: 0.07 TABLET ORAL at 06:28

## 2024-08-16 RX ADMIN — LOSARTAN POTASSIUM 25 MG: 25 TABLET, FILM COATED ORAL at 09:04

## 2024-08-16 ASSESSMENT — PAIN SCALES - GENERAL: PAINLEVEL_OUTOF10: 0

## 2024-08-16 NOTE — CONSULTS
Nutrition Note       Nutrition Assessment:  Consult, patient requesting to speak with an RD regarding chronic diarrhea. Per pt, alternating constipation and diarrhea. Recent KUB showed stool burden. GI saw patient, likely IBS. Discussed low FODMAP diet and soluble fiber intake. Extensive conversation with patient and family regarding soluble fiber supplements, low FODMAP diet, IBS, and fluid restriction given chronic hyponatremia/electrolyte abnormalities with diarrhea.     Current Nutrition Therapies:    ADULT DIET; Regular; 1200 ml    Anthropometrics:   Current Height: 144.8 cm (4' 9\")  Current Weight - Scale: 55.3 kg (122 lb)      Monitoring and Evaluation:  No nutrition diagnosis. Patient will be monitored per nutrition standards of care.     Consult Dietitian if nutrition intervention essential to patient care is needed.     Discharge Planning:  No needs    Regina Lewis RD  Junior:  [phone]

## 2024-08-16 NOTE — PROGRESS NOTES
Patient is alert and oriented x4. VSS on room air. Medications given per MAR, no side effects noted. Patient ambulating x1 with gait belt and walker. No complaints of pain, continuing to monitor and manage per MAR. Voiding well via BRP, x1 bm this shift.      Patient is currently resting in bed with bed alarm on for safety. Call light within reach and all fall precautions in place. Plan of care continues.

## 2024-08-16 NOTE — PLAN OF CARE
Problem: Discharge Planning  Goal: Discharge to home or other facility with appropriate resources  Outcome: Progressing  Flowsheets (Taken 8/16/2024 0800)  Discharge to home or other facility with appropriate resources:   Identify barriers to discharge with patient and caregiver   Arrange for needed discharge resources and transportation as appropriate   Identify discharge learning needs (meds, wound care, etc)   Arrange for interpreters to assist at discharge as needed   Refer to discharge planning if patient needs post-hospital services based on physician order or complex needs related to functional status, cognitive ability or social support system

## 2024-08-16 NOTE — DISCHARGE INSTR - DIET

## 2024-08-16 NOTE — CONSULTS
Nephrology Consult Note                                                                                                                                                                                                                                                                                                                                                               Office : [phone]     Fax :[phone]    Patient's Name: Zully Escudero  9:38 PM  8/15/2024    Reason for Consult:  Hyponatremia   Requesting Physician:  No primary care provider on file.  Chief Complaint:    Chief Complaint   Patient presents with    Dizziness     Pt was at home, states all of a sudden patient daughter was unable to get her up due to patient having dizziness and pressure behind her head. Pt recently came back from out of town, and recently had bp meds changed.        Assessment/Plan     # Hyponatremia   Chronic   SIADH +/- hypovolemic component   Improved w/ fluids restrictions  Plan:  Na q4--> q 12  Fluid restriction < 1.2 L daily   Na Cl 1 gr tid     # HTN  Elevated  Resume Losartan 25 mg bid   Monitor BP     # dizziness  Unlikely induced by hyponatremia   Per primary team     History of Present Ilness:    Zully Escudero is a 89 y.o. female who presented to [facility] with lightheadedness/presyncope.    PMHx significant for recent fall with subdural hyponatremia chronic diarrhea hypertension hypothyroid and chronic hyponatremia      Presents with dizziness.  Dizziness was acute , not associated with CP/ SOB or GI Sx.   Resolved within a few hours.   Reports chronic diarrhea and some abdominal bloating this morning.  Reports some dysphagia especially when swallowing pills.  Denies chest pain chest pressure nausea or vomiting.  Sodium on admission 129   Now with fluids restriction 132  U lexa 340  U Na 82  U K 26  Pt got IVF    Interval hx     Past Medical History:   Diagnosis Date    Cerebral artery

## 2024-08-16 NOTE — PROGRESS NOTES
Occupational Therapy  Daily Treatment Note  Patient Name: Zully Esucdero  MRN: [id number]    Chart Reviewed: Yes     Restrictions/Precautions: Fall Risk (high fall risk) Other position/activity restrictions: elevate head of bed 30 degrees, up with assistance     Additional Pertinent Hx: ED 8/14 related to dizziness; CT head shows No acute intracranial hemorrhage or mass effect. /Unchanged cerebral atrophy and chronic small vessel ischemic changes.     CTA head neck showed   No acute vascular abnormality. Specifically, no large vessel occlusion or flow-limiting stenosis. /Healing fracture of the superior endplate at T2.  PMH: recent SDH 3 weeks ago, chronic hyponatremia, HTN, hypothyroidism        Diagnosis: dizziness  Treatment Diagnosis: Impaired ADLs, mobility and activity tolerance    Subjective: Pt seated in bedside chair upon arrival, agreeable to OT session.    Pain: denied    Objective:    Cognition/Orientation:  WFL    Functional Mobility   Sit to Stand: CGA + cueing for hand placement  Stand to Sit: CGA for eccentric control + cueing for hand placement  Commode Transfer: SBA to sit onto toilet, CGA to stand + use of grab bars  Functional mobility: with RW with CGA, no LOB demonstrated    ADLs   Grooming: SBA standing at sink for oral care and washing hands after toileting.  LB dressing: Pt was educated on use of reacher for donning pants- noted decreased coordination using novel technique. Pt was able to don brief while seated on toilet with supervision to thread pants, increased time to thread L LE. Pulling up over hips with CGA for standing balance but no overt LOB noted.  Toileting: CGA for pants.    Activity Tolerance:  Min fatigue after mobility into bathroom    Patient Education:   Educated pt and pts daughter on use of AE for LB dressing- pt and daughter reported and demod good understanding of use and where to purchase out of pocket.     Safety Devices in Place:  CA activated, call light  in reach and all needs met    Assessment:   Pt demonstrated improved sit to stand transfers and progressing with functional mobility. Pt educated on LB dressing techniques with reacher- pt demod understanding but noted decreased coordination while using AE. However, pt also was able to don brief seated on toilet without use of AE. Pt daughter was educated on methods to purchase AE if pt desires upon dc. Pt completed standing level grooming with SBA, no LOB demonstrated. Pt plans to dc home later today with daughter initially providing 24 hr and then discharging to her home in [place] where she will have increased aide services. Will continue OT POC while she remains in acute care.    [facility] Score:20    Timed Code Treatment Minutes:    Individual Concurrent Group Co-treatment   Time In 1340         Time Out 1418         Minutes 38           Timed Code Treatment Minutes:  38 mins total    Goals:  Short Term Goals  Time Frame for Short Term Goals: discharge  Short Term Goal 1: supervision toilet transfer- not met  Short Term Goal 2: supervision standing balance during grooming tasks- not met  Short Term Goal 3: supervision LB dressing using AE prn- not met  Short Term Goal 4: supervision bathroom mobility for ADLs using LRAD- not met         Plan:      Times Per Week: 2-5        If patient is discharged prior to next treatment, this note will serve as the discharge summary.  Ruth Hernandez, OTR/L #[id number]

## 2024-08-16 NOTE — DISCHARGE SUMMARY
V2.0  Discharge Summary    Name:  Zully Escudero /Age/Sex: 1931 (89 y.o. female)   Admit Date: 2024  Discharge Date: 24    MRN & CSN:  [id number] & [id number] Encounter Date and Time 24 2:13 PM EDT    Attending:  Suyapa Guo MD Discharging Provider: VERONICA Bryant - CNP       Hospital Course:     Brief HPI: Zully Escudero is a 89 y.o. female who presented with with pmh of recent fall with sudden dural hematoma, hyponatremia, chronic diarrhea secondary to IBS, hypertension and hypothyroidism who presents with Stroke-like symptoms.  Patient is complaining of dizziness but no syncope        Plan:   Strokelike symptoms/presyncope  -Rule out cardiac versus neurogenic causes  -MRI head- unremarkable  -Trend troponin   EKG  -Statin dc  -Aspirin (per neurosurgery note on 2024 hold anticoagulation for 2 weeks, outside of that window)  -Neurosurgery consult with worsening headache and recent history of subdural even though not on current imaging-discussed with neurosurgery on phone according to them no subdural noted will not need formal consult.  Continue care per neurology if needed please consult  -Permissive hypertension     Hyponatremia  -Corrected to 130 down to 129 again seen by nephrology team urine studies ordered urine osmole 340, every 6 hours sodium dropped to 128  -Per nursing failed swallow screen unable to take p.o. at this time  -IV fluids 500 cc normal saline x 1  -Nephrology consult-discussed with nephrology team we will decrease losartan to 25 mg p.o. twice daily.  We will discontinue atenolol and salt tablets at this time  Patient will remain on fluid restriction of 1200 mL  Patient had an echocardiogram with normal EF of 55 to 60% normal size ventricle wall thickness     Dysphagia chronic diarrhea secondary to IBS  -GI consult        Discussed management of the case in detail w/ the Emergency Department Provider:          The patient expressed  Regular rate.  Respiratory: Clear to auscultation  Gastrointestinal: Soft, non tender  Genitourinary: no suprapubic tenderness  Musculoskeletal: No edema  Skin: warm, dry  Neuro: Alert.  Psych: Mood appropriate.         Labs and Imaging   Echo (TTE) complete (PRN contrast/bubble/strain/3D)    Result Date: 8/16/2024    Left Ventricle: Normal left ventricular systolic function with a visually estimated EF of 55 - 60%. Left ventricle size is normal. Normal wall thickness. Normal wall motion. Global longitudinal strain is normal. Grade I diastolic dysfunction with normal LAP.  No LV thrombus seen.   Aortic Valve: Thickened cusps. Moderate sclerosis of the aortic valve cusps. Mild regurgitation.  No significant aortic stenosis.   Left Atrium: Left atrium is mildly dilated.   Image quality is fair.     MRI brain without contrast    Result Date: 8/16/2024  Exam:MRI BRAIN WO CONTRAST Date:8/15/2024 16:27 EDT Indication: stroke like symptoms. Comparison: 8/14/2024 Technique: Multiplanar multisequence MR images obtained of the head. Contrast: None FINDINGS: Brain: No diffusion restriction. Trace hyperintense T1/T2 signal subdural collection along the right tentorial leaflet with associated susceptibility artifact, compatible with late subacute subdural hematoma. This layers minimally along the right medial occipital convexity. No new hemorrhage. Mild atrophy. Mild patchy periventricular and subcortical white matter hyperintense T2/FLAIR signal abnormality. Small remote left basal ganglia lacunar infarct. Major intracranial vascular flow voids are unremarkable. The midline structures are normal. Orbits: Prior cataract removal. Paranasal sinuses and mastoid air cells: Clear. Marrow signal and bones: No marrow signal abnormality. Other findings: No additional findings.     Stable trace right tentorial and medial occipital late subacute subdural hematoma. No new hemorrhage or mass effect. Mild atrophy and chronic small vessel

## 2024-08-16 NOTE — PLAN OF CARE
Problem: Safety - Adult  Goal: Free from fall injury  8/15/2024 2251 by Kristyn Burrell RN  Outcome: Progressing     Problem: Pain  Goal: Verbalizes/displays adequate comfort level or baseline comfort level  8/15/2024 2251 by Kristyn Burrell RN  Outcome: Progressing  Flowsheets (Taken 8/15/2024 1235 by Manuelito Vásquez RN)  Verbalizes/displays adequate comfort level or baseline comfort level:   Encourage patient to monitor pain and request assistance   Assess pain using appropriate pain scale   Administer analgesics based on type and severity of pain and evaluate response   Implement non-pharmacological measures as appropriate and evaluate response   Consider cultural and social influences on pain and pain management   Notify Licensed Independent Practitioner if interventions unsuccessful or patient reports new pain

## 2024-08-16 NOTE — PROGRESS NOTES
Physical Therapy/Occupational Therapy  Attempt    PT/OT attempted to see pt for therapy, pt going off floor for echo. Will follow up later today as schedule allows.    Maggi Sandoval PT, DPT, NCS, CSRS

## 2024-08-17 NOTE — PROGRESS NOTES
Nephrology Consult Note                                                                                                                                                                                                                                                                                                                                                               Office : [phone]     Fax :[phone]    Patient's Name: Zully Escudero  11:34 PM  8/16/2024    Reason for Consult:  Hyponatremia   Requesting Physician:  No primary care provider on file.  Chief Complaint:    Chief Complaint   Patient presents with    Dizziness     Pt was at home, states all of a sudden patient daughter was unable to get her up due to patient having dizziness and pressure behind her head. Pt recently came back from out of town, and recently had bp meds changed.        Assessment/Plan     # Hyponatremia   Chronic   SIADH  Plan  Fluid restriction < 1.2 L daily   Increase protein intake     # HTN  Elevated  Resume Losartan 25 mg bid   Salt tablets stopped, d/w Dr Erickson    # dizziness  Unlikely induced by hyponatremia   Per primary team     History of Present Ilness:    Zully Escudero is a 89 y.o. female who presented to [facility] with lightheadedness/presyncope.    PMHx significant for recent fall with subdural hyponatremia chronic diarrhea hypertension hypothyroid and chronic hyponatremia      Presents with dizziness.  Dizziness was acute , not associated with CP/ SOB or GI Sx.   Resolved within a few hours.   Reports chronic diarrhea and some abdominal bloating this morning.  Reports some dysphagia especially when swallowing pills.  Denies chest pain chest pressure nausea or vomiting.  Sodium on admission 129   Now with fluids restriction 132  U lexa 340  U Na 82  U K 26  Pt got IVF    Interval hx     Past Medical History:   Diagnosis Date    Cerebral artery occlusion with cerebral infarction (HCC)      Depression     Hemorrhagic stroke (HCC)     Hyperlipidemia     Hypertension     Thyroid disease        Past Surgical History:   Procedure Laterality Date    BRAIN SURGERY      2014 had drain placed after brain bleed was in ICU.    CARDIAC SURGERY      bypass in 2010 in [facility].       No family history on file.     reports that she has never smoked. She has never used smokeless tobacco. She reports that she does not drink alcohol.    Allergies:  Ciprofibrate, Ciprofloxacin, Doxycycline calcium, Nitrofurantoin, Prednisone, Rosuvastatin, and Sulfa antibiotics    Current Medications:    No current facility-administered medications for this encounter.      Review of Systems:   14 point ROS obtained but were negative except mentioned in HPI      Physical exam:     Vitals:  BP (!) 166/69   Pulse 87   Temp 97.4 °F (36.3 °C) (Oral)   Resp 16   Ht 1.448 m (4' 9\")   Wt 55.3 kg (122 lb)   SpO2 93%   BMI 26.40 kg/m²   Constitutional:  OAA X3 NAD Yes  Skin: no rash, turgor wnl  Heent:  eomi, mmm  Neck: no bruits or jvd noted  Cardiovascular:  S1, S2 without m/r/g  Respiratory: CTA B without w/r/r  Abdomen:  , soft, nt, nd  Ext:  lower extremity edema No  Psychiatric: mood and affect yes   Musculoskeletal:  Rom, muscular strength intact    Data:   Labs:  CBC:   Recent Labs     08/14/24  1154 08/15/24  0533 08/16/24  0622   WBC 5.7 4.8 5.2   HGB 12.5 11.3* 11.2*    282 292     BMP:    Recent Labs     08/14/24  2217 08/14/24  2355 08/15/24  0533 08/15/24  1100 08/15/24  1933 08/16/24  0622 08/16/24  0931   *   < > 130*  131*   < > 132* 132* 130*   K 3.5  --  3.9  --   --  3.9  --    CL 92*  --  93*  --   --  96*  --    CO2 23  --  22  --   --  24  --    BUN 7  --  7  --   --  7  --    CREATININE <0.5*  --  <0.5*  --   --  <0.5*  --    GLUCOSE 153*  --  102*  --   --  107*  --     < > = values in this interval not displayed.     Ca/Mg/Phos:   Recent Labs     08/14/24  2217 08/15/24  0533 08/16/24  0622    CALCIUM 8.8 8.7 8.7     Hepatic:   Recent Labs     08/15/24  0533 08/16/24  0622   AST 24 22   ALT 21 21   BILITOT <0.2 0.3   ALKPHOS 68 69     Troponin: No results for input(s): \"TROPONINI\" in the last 72 hours.  BNP: No results for input(s): \"BNP\" in the last 72 hours.  Lipids:   Recent Labs     08/15/24  0533   CHOL 131   TRIG 112   HDL 52     ABGs: No results for input(s): \"PHART\", \"PO2ART\", \"AVU5VZL\" in the last 72 hours.  INR:   Recent Labs     08/14/24  1154   INR 1.01     UA:  Recent Labs     08/14/24  1410   COLORU Yellow   CLARITYU Clear   GLUCOSEU Negative   BILIRUBINUR Negative   KETUA Negative   BLOODU TRACE-INTACT*   PHUR 6.0   PROTEINU Negative   UROBILINOGEN 0.2   NITRU Negative   LEUKOCYTESUR Negative   URINETYPE NotGiven      Urine Microscopic:   Recent Labs     08/14/24  1410   WBCUA None seen   RBCUA None seen     Urine Culture: No results for input(s): \"LABURIN\" in the last 72 hours.  Urine Chemistry:   Recent Labs     08/15/24  1025   NAUR 82         IMAGING:  MRI brain without contrast   Final Result      Stable trace right tentorial and medial occipital late subacute subdural hematoma. No new hemorrhage or mass effect.      Mild atrophy and chronic small vessel ischemic change with small remote lacunar infarct left basal ganglia.      Electronically signed by Paolo Palomo MD      XR CHEST PORTABLE   Final Result      Clear lungs.      Normal cardiomediastinal silhouette status post sternotomy.      Electronically signed by Jose Enrique Flannery      CT HEAD WO CONTRAST   Final Result      No acute intracranial hemorrhage or mass effect.        Unchanged cerebral atrophy and chronic small vessel ischemic changes.      Critical results reported to María Soliz PA-C  at 12:03 on 8/14/2024.      Electronically signed by Carroll Ruiz      CTA HEAD NECK W CONTRAST   Final Result      1.  No acute vascular abnormality. Specifically, no large vessel occlusion or   flow-limiting stenosis.    2.

## 2024-08-28 NOTE — PROGRESS NOTES
Physician Progress Note      PATIENT:               SCOTT VALLES  CSN #:                  [id number]  :                       1931  ADMIT DATE:       2024 11:30 AM  DISCH DATE:        2024 3:23 PM  RESPONDING  PROVIDER #:        MANUEL FITZPATRICK          QUERY TEXT:    Pt admitted - with dizziness, stroke like symptoms and hyponatremia.    Noted documentation of SIADH by nephrology.  If possible, please document in   progress notes:    The medical record reflects the following:  Risk Factors: 89 year old female  Clinical Indicators: Na on admit 129, Urine Osmolality 340, Urine Sodium 82  Nephrology : \"Hyponatremia, Chronic. SIADH. Plan, Fluid restriction < 1.2   L daily, Increase protein intake.\"  Nephrology 8/15: \"Hyponatremia, Chronic. SIADH +/- hypovolemic component.\"    Treatment: fluid restriction, decrease losartan to 25 mg p.o. twice daily,   discontinue atenolol and salt tablets, Nephrology consult  Options provided:  -- Syndrome of inappropriate secretion of antidiuretic hormone (SIADH)   confirmed present on admission  -- Hyponatremia without SIADH  -- Other - I will add my own diagnosis  -- Disagree - Not applicable / Not valid  -- Disagree - Clinically unable to determine / Unknown  -- Refer to Clinical Documentation Reviewer    PROVIDER RESPONSE TEXT:    The diagnosis of SIADH was confirmed as present on admission.    Query created by: Yessy Christine on 2024 11:03 AM      Electronically signed by:  MANUEL FITZPATRICK 2024 8:28 AM

## 2024-08-28 NOTE — PROGRESS NOTES
Physician Progress Note      PATIENT:               SCOTT VALLES  [facility] #:                  [id number]  :                       1931  ADMIT DATE:       2024 11:30 AM  DISCH DATE:        2024 3:23 PM  RESPONDING  PROVIDER #:        MANUEL FITZPATRICK          QUERY TEXT:    Pt admitted - with dizziness, stroke like symptoms and hyponatremia.   Pt noted to have TIA by ED provider. If possible, please document in progress   notes if you are evaluating and /or treating any of the following:    The medical record reflects the following:  Risk Factors: 89 year old female PMH HTN and previous CVA  Clinical Indicators:  Nephrology : \"dizziness, Unlikely induced by hyponatremia.\"  DC Summary: \"Stroke-like symptoms/presyncope. MRI head- unremarkable.   Neurosurgery consult with worsening headache and recent history of subdural   even though not on current imaging-discussed with neurosurgery on phone   according to them no subdural noted will not need formal consult. Permissive   hypertension. Hyponatremia. Corrected to 130 down to 129 again seen by   nephrology team urine studies ordered urine osmole 340 Nephrology   consult-discussed with nephrology team we will decrease losartan to 25 mg p.o.   twice daily.  We will discontinue atenolol and salt tablets at this ti  ED: \"Clinical Impression: Dizziness, TIA (transient ischemic attack).\"    Treatment: fluid restriction, decrease losartan to 25 mg p.o. twice daily,   discontinue atenolol and salt tablets, Nephrology consult, MRI brain, CTA   Head, CXR  Options provided:  -- Stroke-like symptoms/dizziness likely due to TIA  -- Stroke-like symptoms/dizziness likely due to SIADH  -- Stroke-like symptoms/dizziness likely due to other, please specify  -- Other - I will add my own diagnosis  -- Disagree - Not applicable / Not valid  -- Disagree - Clinically unable to determine / Unknown  -- Refer to Clinical Documentation Reviewer    PROVIDER